# Patient Record
Sex: FEMALE | Race: BLACK OR AFRICAN AMERICAN | Employment: UNEMPLOYED | URBAN - METROPOLITAN AREA
[De-identification: names, ages, dates, MRNs, and addresses within clinical notes are randomized per-mention and may not be internally consistent; named-entity substitution may affect disease eponyms.]

---

## 2017-11-17 ENCOUNTER — HOSPITAL ENCOUNTER (OUTPATIENT)
Age: 57
Setting detail: SPECIMEN
Discharge: HOME | End: 2017-11-17
Payer: MEDICARE

## 2017-11-22 LAB — SURGICAL PATHOLOGY REPORT: NORMAL

## 2019-09-20 ENCOUNTER — HOSPITAL ENCOUNTER (OUTPATIENT)
Age: 59
Setting detail: SPECIMEN
Discharge: HOME | End: 2019-09-20
Payer: MEDICARE

## 2019-09-23 LAB — SURGICAL PATHOLOGY REPORT: NORMAL

## 2020-01-01 ENCOUNTER — HOSPITAL ENCOUNTER (OUTPATIENT)
Facility: MEDICAL CENTER | Age: 60
Discharge: HOME | End: 2020-08-11
Payer: MEDICARE

## 2020-01-01 ENCOUNTER — HOSPITAL ENCOUNTER (OUTPATIENT)
Dept: INTERVENTIONAL RADIOLOGY/VASCULAR | Age: 60
Discharge: HOME | End: 2020-11-01
Payer: MEDICARE

## 2020-01-01 ENCOUNTER — HOSPITAL ENCOUNTER (OUTPATIENT)
Dept: CT IMAGING | Age: 60
Discharge: HOME | End: 2020-04-11
Payer: MEDICARE

## 2020-01-01 ENCOUNTER — APPOINTMENT (OUTPATIENT)
Dept: GENERAL RADIOLOGY | Age: 60
DRG: 139 | End: 2020-01-01
Payer: MEDICARE

## 2020-01-01 ENCOUNTER — HOSPITAL ENCOUNTER (OUTPATIENT)
Dept: GENERAL RADIOLOGY | Age: 60
Discharge: HOME | End: 2020-04-11
Payer: MEDICARE

## 2020-01-01 ENCOUNTER — HOSPITAL ENCOUNTER (OUTPATIENT)
Dept: INTERVENTIONAL RADIOLOGY/VASCULAR | Age: 60
Discharge: HOME | End: 2020-04-11
Payer: MEDICARE

## 2020-01-01 ENCOUNTER — HOSPITAL ENCOUNTER (INPATIENT)
Age: 60
LOS: 3 days | DRG: 139 | End: 2020-12-17
Attending: EMERGENCY MEDICINE | Admitting: INTERNAL MEDICINE
Payer: MEDICARE

## 2020-01-01 ENCOUNTER — APPOINTMENT (OUTPATIENT)
Dept: GENERAL RADIOLOGY | Age: 60
DRG: 144 | End: 2020-01-01
Payer: MEDICARE

## 2020-01-01 ENCOUNTER — TELEPHONE (OUTPATIENT)
Dept: INTERVENTIONAL RADIOLOGY/VASCULAR | Age: 60
End: 2020-01-01

## 2020-01-01 ENCOUNTER — TELEPHONE (OUTPATIENT)
Dept: CASE MANAGEMENT | Age: 60
End: 2020-01-01

## 2020-01-01 ENCOUNTER — TELEPHONE (OUTPATIENT)
Dept: INFUSION THERAPY | Facility: MEDICAL CENTER | Age: 60
End: 2020-01-01

## 2020-01-01 ENCOUNTER — ANESTHESIA (OUTPATIENT)
Dept: OPERATING ROOM | Age: 60
DRG: 144 | End: 2020-01-01
Payer: MEDICARE

## 2020-01-01 ENCOUNTER — TELEPHONE (OUTPATIENT)
Dept: ONCOLOGY | Age: 60
End: 2020-01-01

## 2020-01-01 ENCOUNTER — ANESTHESIA EVENT (OUTPATIENT)
Dept: OPERATING ROOM | Age: 60
DRG: 144 | End: 2020-01-01
Payer: MEDICARE

## 2020-01-01 ENCOUNTER — TELEPHONE (OUTPATIENT)
Dept: PRIMARY CARE CLINIC | Age: 60
End: 2020-01-01

## 2020-01-01 ENCOUNTER — APPOINTMENT (OUTPATIENT)
Dept: CT IMAGING | Age: 60
DRG: 144 | End: 2020-01-01
Payer: MEDICARE

## 2020-01-01 ENCOUNTER — HOSPITAL ENCOUNTER (OUTPATIENT)
Dept: PREADMISSION TESTING | Age: 60
Setting detail: SPECIMEN
Discharge: HOME | End: 2020-10-30
Payer: MEDICARE

## 2020-01-01 ENCOUNTER — HOSPITAL ENCOUNTER (INPATIENT)
Age: 60
LOS: 3 days | Discharge: HOME | DRG: 144 | End: 2020-03-13
Attending: EMERGENCY MEDICINE | Admitting: INTERNAL MEDICINE
Payer: MEDICARE

## 2020-01-01 ENCOUNTER — HOSPITAL ENCOUNTER (OUTPATIENT)
Dept: INFUSION THERAPY | Facility: MEDICAL CENTER | Age: 60
Discharge: HOME | End: 2020-08-12
Payer: MEDICARE

## 2020-01-01 ENCOUNTER — HOSPITAL ENCOUNTER (OUTPATIENT)
Dept: GENERAL RADIOLOGY | Age: 60
Discharge: HOME | End: 2020-11-01
Payer: MEDICARE

## 2020-01-01 ENCOUNTER — APPOINTMENT (OUTPATIENT)
Dept: CT IMAGING | Age: 60
DRG: 139 | End: 2020-01-01
Payer: MEDICARE

## 2020-01-01 ENCOUNTER — APPOINTMENT (OUTPATIENT)
Dept: ULTRASOUND IMAGING | Age: 60
DRG: 144 | End: 2020-01-01
Payer: MEDICARE

## 2020-01-01 VITALS
HEIGHT: 64 IN | WEIGHT: 113.5 LBS | SYSTOLIC BLOOD PRESSURE: 131 MMHG | DIASTOLIC BLOOD PRESSURE: 77 MMHG | HEART RATE: 126 BPM | RESPIRATION RATE: 16 BRPM | BODY MASS INDEX: 19.38 KG/M2 | OXYGEN SATURATION: 90 % | TEMPERATURE: 97.5 F

## 2020-01-01 VITALS
HEART RATE: 126 BPM | RESPIRATION RATE: 18 BRPM | DIASTOLIC BLOOD PRESSURE: 82 MMHG | SYSTOLIC BLOOD PRESSURE: 114 MMHG | TEMPERATURE: 96.9 F | WEIGHT: 109 LBS | BODY MASS INDEX: 18.61 KG/M2 | HEIGHT: 64 IN | OXYGEN SATURATION: 98 %

## 2020-01-01 VITALS
WEIGHT: 117 LBS | OXYGEN SATURATION: 96 % | HEIGHT: 64 IN | DIASTOLIC BLOOD PRESSURE: 83 MMHG | RESPIRATION RATE: 23 BRPM | SYSTOLIC BLOOD PRESSURE: 117 MMHG | BODY MASS INDEX: 19.97 KG/M2 | TEMPERATURE: 96.8 F | HEART RATE: 113 BPM

## 2020-01-01 VITALS
HEIGHT: 61 IN | TEMPERATURE: 97.1 F | WEIGHT: 81 LBS | HEART RATE: 105 BPM | RESPIRATION RATE: 37 BRPM | OXYGEN SATURATION: 46 % | BODY MASS INDEX: 15.29 KG/M2 | DIASTOLIC BLOOD PRESSURE: 45 MMHG | SYSTOLIC BLOOD PRESSURE: 64 MMHG

## 2020-01-01 VITALS — DIASTOLIC BLOOD PRESSURE: 101 MMHG | SYSTOLIC BLOOD PRESSURE: 155 MMHG | OXYGEN SATURATION: 99 % | TEMPERATURE: 95.4 F

## 2020-01-01 VITALS
DIASTOLIC BLOOD PRESSURE: 82 MMHG | TEMPERATURE: 98.2 F | SYSTOLIC BLOOD PRESSURE: 113 MMHG | RESPIRATION RATE: 18 BRPM | HEART RATE: 110 BPM

## 2020-01-01 DIAGNOSIS — R91.8 MASS OF RIGHT LUNG: Primary | ICD-10-CM

## 2020-01-01 LAB
ABO/RH: NORMAL
ABSOLUTE EOS #: 0 K/UL (ref 0–0.44)
ABSOLUTE EOS #: 0.06 K/UL (ref 0–0.44)
ABSOLUTE EOS #: 0.08 K/UL (ref 0–0.4)
ABSOLUTE IMMATURE GRANULOCYTE: 0 K/UL (ref 0–0.3)
ABSOLUTE IMMATURE GRANULOCYTE: 0 K/UL (ref 0–0.3)
ABSOLUTE IMMATURE GRANULOCYTE: 0.05 K/UL (ref 0–0.3)
ABSOLUTE LYMPH #: 1.1 K/UL (ref 1.1–3.7)
ABSOLUTE LYMPH #: 1.13 K/UL (ref 1–4.8)
ABSOLUTE LYMPH #: 1.53 K/UL (ref 1.1–3.7)
ABSOLUTE MONO #: 0.62 K/UL (ref 0.2–0.8)
ABSOLUTE MONO #: 0.65 K/UL (ref 0.1–1.2)
ABSOLUTE MONO #: 0.91 K/UL (ref 0.1–1.2)
ADENOVIRUS PCR: NOT DETECTED
ANION GAP SERPL CALCULATED.3IONS-SCNC: 10 MMOL/L (ref 9–17)
ANION GAP SERPL CALCULATED.3IONS-SCNC: 11 MMOL/L (ref 9–17)
ANION GAP SERPL CALCULATED.3IONS-SCNC: 13 MMOL/L (ref 9–17)
ANION GAP SERPL CALCULATED.3IONS-SCNC: 15 MMOL/L (ref 9–17)
ANION GAP SERPL CALCULATED.3IONS-SCNC: 20 MMOL/L (ref 9–17)
ANION GAP SERPL CALCULATED.3IONS-SCNC: 8 MMOL/L (ref 9–17)
ANTIBODY SCREEN: NEGATIVE
APPEARANCE FLUID: NORMAL
ARM BAND NUMBER: NORMAL
BASO FLUID: NORMAL %
BASOPHILS # BLD: 0 % (ref 0–2)
BASOPHILS # BLD: 1 %
BASOPHILS # BLD: 1 % (ref 0–2)
BASOPHILS ABSOLUTE: 0 K/UL (ref 0–0.2)
BASOPHILS ABSOLUTE: 0.04 K/UL (ref 0–0.2)
BASOPHILS ABSOLUTE: 0.05 K/UL (ref 0–0.2)
BLD PROD TYP BPU: NORMAL
BLD PROD TYP BPU: NORMAL
BNP INTERPRETATION: NORMAL
BNP INTERPRETATION: NORMAL
BORDETELLA PARAPERTUSSIS: NOT DETECTED
BORDETELLA PERTUSSIS PCR: NOT DETECTED
BUN BLDV-MCNC: 10 MG/DL (ref 6–20)
BUN BLDV-MCNC: 14 MG/DL (ref 6–20)
BUN BLDV-MCNC: 17 MG/DL (ref 6–20)
BUN BLDV-MCNC: 6 MG/DL (ref 6–20)
BUN/CREAT BLD: 14 (ref 9–20)
BUN/CREAT BLD: 17 (ref 9–20)
BUN/CREAT BLD: 19 (ref 9–20)
BUN/CREAT BLD: 26 (ref 9–20)
BUN/CREAT BLD: 29 (ref 9–20)
BUN/CREAT BLD: 9 (ref 9–20)
C-REACTIVE PROTEIN: 15.4 MG/L (ref 0–5)
CALCIUM SERPL-MCNC: 7.9 MG/DL (ref 8.6–10.4)
CALCIUM SERPL-MCNC: 8.2 MG/DL (ref 8.6–10.4)
CALCIUM SERPL-MCNC: 8.4 MG/DL (ref 8.6–10.4)
CALCIUM SERPL-MCNC: 8.5 MG/DL (ref 8.6–10.4)
CALCIUM SERPL-MCNC: 9 MG/DL (ref 8.6–10.4)
CALCIUM SERPL-MCNC: 9.5 MG/DL (ref 8.6–10.4)
CASE NUMBER:: NORMAL
CHLAMYDIA PNEUMONIAE BY PCR: NOT DETECTED
CHLORIDE BLD-SCNC: 100 MMOL/L (ref 98–107)
CHLORIDE BLD-SCNC: 101 MMOL/L (ref 98–107)
CHLORIDE BLD-SCNC: 105 MMOL/L (ref 98–107)
CHLORIDE BLD-SCNC: 94 MMOL/L (ref 98–107)
CHLORIDE BLD-SCNC: 97 MMOL/L (ref 98–107)
CHLORIDE BLD-SCNC: 98 MMOL/L (ref 98–107)
CHOLESTEROL/HDL RATIO: 3.2
CHOLESTEROL: 152 MG/DL
CO2: 23 MMOL/L (ref 20–31)
CO2: 24 MMOL/L (ref 20–31)
CO2: 24 MMOL/L (ref 20–31)
CO2: 26 MMOL/L (ref 20–31)
CO2: 28 MMOL/L (ref 20–31)
CO2: 29 MMOL/L (ref 20–31)
COLOR FLUID: NORMAL
CORONAVIRUS 229E PCR: NOT DETECTED
CORONAVIRUS HKU1 PCR: NOT DETECTED
CORONAVIRUS NL63 PCR: NOT DETECTED
CORONAVIRUS OC43 PCR: NOT DETECTED
CREAT SERPL-MCNC: 0.49 MG/DL (ref 0.5–0.9)
CREAT SERPL-MCNC: 0.65 MG/DL (ref 0.5–0.9)
CREAT SERPL-MCNC: 0.68 MG/DL (ref 0.5–0.9)
CREAT SERPL-MCNC: 0.73 MG/DL (ref 0.5–0.9)
CREAT SERPL-MCNC: 0.74 MG/DL (ref 0.5–0.9)
CREAT SERPL-MCNC: 0.81 MG/DL (ref 0.5–0.9)
CROSSMATCH RESULT: NORMAL
CROSSMATCH RESULT: NORMAL
CULTURE: ABNORMAL
CULTURE: NO GROWTH
CULTURE: NORMAL
D-DIMER QUANTITATIVE: 3.71 MG/L FEU (ref 0–0.59)
D-DIMER QUANTITATIVE: 3.86 MG/L FEU
DIFFERENTIAL TYPE: ABNORMAL
DIRECT EXAM: ABNORMAL
DIRECT EXAM: ABNORMAL
DIRECT EXAM: NORMAL
DISPENSE STATUS BLOOD BANK: NORMAL
DISPENSE STATUS BLOOD BANK: NORMAL
EKG ATRIAL RATE: 117 BPM
EKG ATRIAL RATE: 134 BPM
EKG P AXIS: 75 DEGREES
EKG P AXIS: 76 DEGREES
EKG P-R INTERVAL: 126 MS
EKG P-R INTERVAL: 148 MS
EKG Q-T INTERVAL: 304 MS
EKG Q-T INTERVAL: 324 MS
EKG QRS DURATION: 68 MS
EKG QRS DURATION: 70 MS
EKG QTC CALCULATION (BAZETT): 451 MS
EKG QTC CALCULATION (BAZETT): 453 MS
EKG R AXIS: 29 DEGREES
EKG R AXIS: 66 DEGREES
EKG T AXIS: 55 DEGREES
EKG T AXIS: 74 DEGREES
EKG VENTRICULAR RATE: 117 BPM
EKG VENTRICULAR RATE: 134 BPM
EOSINOPHIL FLUID: NORMAL %
EOSINOPHILS RELATIVE PERCENT: 0 % (ref 1–4)
EOSINOPHILS RELATIVE PERCENT: 1 % (ref 1–4)
EOSINOPHILS RELATIVE PERCENT: 2 % (ref 1–4)
EXPIRATION DATE: NORMAL
FERRITIN: 3423 UG/L (ref 13–150)
FIO2: 1
FIO2: 1
FIO2: 100
FLOW CYTOMETRY SOURCE: NORMAL
FLOW CYTOMETRY, NODE/FLUID: NORMAL
FLUID DIFF COMMENT: NORMAL
FOLATE: >20 NG/ML
GFR AFRICAN AMERICAN: >60 ML/MIN
GFR NON-AFRICAN AMERICAN: >60 ML/MIN
GFR SERPL CREATININE-BSD FRML MDRD: ABNORMAL ML/MIN/{1.73_M2}
GLUCOSE BLD-MCNC: 108 MG/DL (ref 70–99)
GLUCOSE BLD-MCNC: 113 MG/DL (ref 70–99)
GLUCOSE BLD-MCNC: 118 MG/DL (ref 65–105)
GLUCOSE BLD-MCNC: 122 MG/DL (ref 65–105)
GLUCOSE BLD-MCNC: 135 MG/DL (ref 70–99)
GLUCOSE BLD-MCNC: 152 MG/DL (ref 65–105)
GLUCOSE BLD-MCNC: 243 MG/DL (ref 65–105)
GLUCOSE BLD-MCNC: 273 MG/DL (ref 65–105)
GLUCOSE BLD-MCNC: 273 MG/DL (ref 70–99)
GLUCOSE BLD-MCNC: 94 MG/DL (ref 70–99)
GLUCOSE BLD-MCNC: 95 MG/DL (ref 70–99)
GLUCOSE, FLUID: 125 MG/DL
HCT VFR BLD CALC: 26.2 % (ref 36.3–47.1)
HCT VFR BLD CALC: 28.6 % (ref 36.3–47.1)
HCT VFR BLD CALC: 32.9 % (ref 36.3–47.1)
HCT VFR BLD CALC: 34.6 % (ref 36.3–47.1)
HCT VFR BLD CALC: 36 % (ref 36.3–47.1)
HCT VFR BLD CALC: 38.1 % (ref 36.3–47.1)
HDLC SERPL-MCNC: 47 MG/DL
HEMOGLOBIN: 10.6 G/DL (ref 11.9–15.1)
HEMOGLOBIN: 11.7 G/DL (ref 11.9–15.1)
HEMOGLOBIN: 11.9 G/DL (ref 11.9–15.1)
HEMOGLOBIN: 12.7 G/DL (ref 11.9–15.1)
HEMOGLOBIN: 8.3 G/DL (ref 11.9–15.1)
HEMOGLOBIN: 9.2 G/DL (ref 11.9–15.1)
HUMAN METAPNEUMOVIRUS PCR: NOT DETECTED
IMMATURE GRANULOCYTES: 0 %
IMMATURE GRANULOCYTES: 0 %
IMMATURE GRANULOCYTES: 1 %
INFLUENZA A BY PCR: NOT DETECTED
INFLUENZA A H1 (2009) PCR: NORMAL
INFLUENZA A H1 PCR: NORMAL
INFLUENZA A H3 PCR: NORMAL
INFLUENZA B BY PCR: NOT DETECTED
INR BLD: 1
INR BLD: 1.1
IRON SATURATION: 60 % (ref 20–55)
IRON: 96 UG/DL (ref 37–145)
LACTATE DEHYDROGENASE, FLUID: 381 U/L
LACTATE DEHYDROGENASE: 592 U/L (ref 135–214)
LDL CHOLESTEROL: 85 MG/DL (ref 0–130)
LV EF: 65 %
LVEF MODALITY: NORMAL
LYMPHOCYTES # BLD: 23 % (ref 24–43)
LYMPHOCYTES # BLD: 26 % (ref 24–43)
LYMPHOCYTES # BLD: 29 % (ref 24–44)
LYMPHOCYTES, BODY FLUID: 13 %
Lab: ABNORMAL
Lab: NORMAL
MAGNESIUM: 1.2 MG/DL (ref 1.6–2.6)
MAGNESIUM: 1.9 MG/DL (ref 1.6–2.6)
MCH RBC QN AUTO: 34.7 PG (ref 25.2–33.5)
MCH RBC QN AUTO: 35 PG (ref 25.2–33.5)
MCH RBC QN AUTO: 35.5 PG (ref 25.2–33.5)
MCH RBC QN AUTO: 37.1 PG (ref 25.2–33.5)
MCH RBC QN AUTO: 37.6 PG (ref 25.2–33.5)
MCH RBC QN AUTO: 38 PG (ref 25.2–33.5)
MCHC RBC AUTO-ENTMCNC: 31.7 G/DL (ref 28.4–34.8)
MCHC RBC AUTO-ENTMCNC: 32.2 G/DL (ref 28.4–34.8)
MCHC RBC AUTO-ENTMCNC: 32.2 G/DL (ref 28.4–34.8)
MCHC RBC AUTO-ENTMCNC: 33.1 G/DL (ref 28.4–34.8)
MCHC RBC AUTO-ENTMCNC: 33.3 G/DL (ref 28.4–34.8)
MCHC RBC AUTO-ENTMCNC: 33.8 G/DL (ref 28.4–34.8)
MCV RBC AUTO: 108.6 FL (ref 82.6–102.9)
MCV RBC AUTO: 109.6 FL (ref 82.6–102.9)
MCV RBC AUTO: 110.4 FL (ref 82.6–102.9)
MCV RBC AUTO: 111.3 FL (ref 82.6–102.9)
MCV RBC AUTO: 112.1 FL (ref 82.6–102.9)
MCV RBC AUTO: 114.1 FL (ref 82.6–102.9)
MISCELLANEOUS LAB TEST RESULT: NORMAL
MONOCYTE, FLUID: NORMAL %
MONOCYTES # BLD: 11 % (ref 3–12)
MONOCYTES # BLD: 16 % (ref 1–7)
MONOCYTES # BLD: 19 % (ref 3–12)
MORPHOLOGY: ABNORMAL
MYCOPLASMA PNEUMONIAE PCR: NOT DETECTED
MYOGLOBIN: 22 NG/ML (ref 25–58)
MYOGLOBIN: 22 NG/ML (ref 25–58)
NEGATIVE BASE EXCESS, ART: 20 (ref 0–2)
NEGATIVE BASE EXCESS, ART: ABNORMAL (ref 0–2)
NEGATIVE BASE EXCESS, ART: ABNORMAL (ref 0–2)
NEUTROPHIL, FLUID: 0 %
NRBC AUTOMATED: 0 PER 100 WBC
NRBC AUTOMATED: 0 PER 100 WBC
NRBC AUTOMATED: 0.2 PER 100 WBC
NRBC AUTOMATED: 0.4 PER 100 WBC
NRBC AUTOMATED: 0.4 PER 100 WBC
NRBC AUTOMATED: ABNORMAL PER 100 WBC
O2 DEVICE/FLOW/%: ABNORMAL
OTHER CELLS FLUID: NORMAL %
PARAINFLUENZA 1 PCR: NOT DETECTED
PARAINFLUENZA 2 PCR: NOT DETECTED
PARAINFLUENZA 3 PCR: NOT DETECTED
PARAINFLUENZA 4 PCR: NOT DETECTED
PARTIAL THROMBOPLASTIN TIME: 30.8 SEC (ref 23–31)
PARTIAL THROMBOPLASTIN TIME: 31 SEC (ref 23–31)
PARTIAL THROMBOPLASTIN TIME: 31.4 SEC (ref 23–31)
PATIENT TEMP: 37
PATIENT TEMP: 37
PATIENT TEMP: 98.9
PDW BLD-RTO: 12.8 % (ref 11.8–14.4)
PDW BLD-RTO: 12.9 % (ref 11.8–14.4)
PDW BLD-RTO: 13 % (ref 11.8–14.4)
PDW BLD-RTO: 22.3 % (ref 11.8–14.4)
PDW BLD-RTO: 22.5 % (ref 11.8–14.4)
PDW BLD-RTO: 22.5 % (ref 11.8–14.4)
PH FLUID: 8
PLATELET # BLD: 103 K/UL (ref 138–453)
PLATELET # BLD: 112 K/UL (ref 138–453)
PLATELET # BLD: 115 K/UL (ref 138–453)
PLATELET # BLD: 125 K/UL (ref 138–453)
PLATELET # BLD: 140 K/UL (ref 138–453)
PLATELET # BLD: 148 K/UL (ref 138–453)
PLATELET # BLD: 161 K/UL (ref 138–453)
PLATELET # BLD: 163 K/UL (ref 138–453)
PLATELET ESTIMATE: ABNORMAL
PMV BLD AUTO: 10.1 FL (ref 8.1–13.5)
PMV BLD AUTO: 10.1 FL (ref 8.1–13.5)
PMV BLD AUTO: 10.3 FL (ref 8.1–13.5)
PMV BLD AUTO: 11.1 FL (ref 8.1–13.5)
PMV BLD AUTO: 11.6 FL (ref 8.1–13.5)
PMV BLD AUTO: 11.9 FL (ref 8.1–13.5)
POC HCO3: 28.4 MMOL/L (ref 22–27)
POC HCO3: 32.1 MMOL/L (ref 22–27)
POC HCO3: 9.9 MMOL/L (ref 22–27)
POC O2 SATURATION: 84 %
POC O2 SATURATION: 88 %
POC O2 SATURATION: 92 %
POC PCO2 TEMP: ABNORMAL MM HG
POC PCO2: 32 MM HG (ref 32–45)
POC PCO2: 44 MM HG (ref 32–45)
POC PCO2: 45 MM HG (ref 32–45)
POC PH TEMP: ABNORMAL
POC PH: 7.1 (ref 7.35–7.45)
POC PH: 7.42 (ref 7.35–7.45)
POC PH: 7.46 (ref 7.35–7.45)
POC PO2 TEMP: ABNORMAL MM HG
POC PO2: 53 MM HG (ref 75–95)
POC PO2: 62 MM HG (ref 75–95)
POC PO2: 65 MM HG (ref 75–95)
POSITIVE BASE EXCESS, ART: 4 (ref 0–2)
POSITIVE BASE EXCESS, ART: 8 (ref 0–2)
POSITIVE BASE EXCESS, ART: ABNORMAL (ref 0–2)
POTASSIUM SERPL-SCNC: 3.4 MMOL/L (ref 3.7–5.3)
POTASSIUM SERPL-SCNC: 3.9 MMOL/L (ref 3.7–5.3)
POTASSIUM SERPL-SCNC: 4.1 MMOL/L (ref 3.7–5.3)
POTASSIUM SERPL-SCNC: 4.1 MMOL/L (ref 3.7–5.3)
POTASSIUM SERPL-SCNC: 4.3 MMOL/L (ref 3.7–5.3)
POTASSIUM SERPL-SCNC: 4.4 MMOL/L (ref 3.7–5.3)
PRO-BNP: 69 PG/ML
PRO-BNP: 98 PG/ML
PROCALCITONIN: 0.13 NG/ML
PROTHROMBIN TIME: 10.7 SEC (ref 9.7–11.6)
PROTHROMBIN TIME: 11 SEC (ref 9.7–11.6)
PROTHROMBIN TIME: 11 SEC (ref 9.7–11.6)
PROTHROMBIN TIME: 13.7 SEC (ref 11.5–14.2)
RBC # BLD: 2.39 M/UL (ref 3.95–5.11)
RBC # BLD: 2.59 M/UL (ref 3.95–5.11)
RBC # BLD: 3.03 M/UL (ref 3.95–5.11)
RBC # BLD: 3.11 M/UL (ref 3.95–5.11)
RBC # BLD: 3.21 M/UL (ref 3.95–5.11)
RBC # BLD: 3.34 M/UL (ref 3.95–5.11)
RBC # BLD: ABNORMAL 10*6/UL
RBC FLUID: NORMAL /MM3
RESP SYNCYTIAL VIRUS PCR: NOT DETECTED
RHINO/ENTEROVIRUS PCR: NOT DETECTED
SARS-COV-2, NAA: NOT DETECTED
SARS-COV-2, PCR: NOT DETECTED
SARS-COV-2, RAPID: NOT DETECTED
SARS-COV-2: NORMAL
SARS-COV-2: NORMAL
SEG NEUTROPHILS: 52 % (ref 36–66)
SEG NEUTROPHILS: 56 % (ref 36–65)
SEG NEUTROPHILS: 62 % (ref 36–65)
SEGMENTED NEUTROPHILS ABSOLUTE COUNT: 2.03 K/UL (ref 1.8–7.7)
SEGMENTED NEUTROPHILS ABSOLUTE COUNT: 2.69 K/UL (ref 1.5–8.1)
SEGMENTED NEUTROPHILS ABSOLUTE COUNT: 3.66 K/UL (ref 1.5–8.1)
SODIUM BLD-SCNC: 133 MMOL/L (ref 135–144)
SODIUM BLD-SCNC: 136 MMOL/L (ref 135–144)
SODIUM BLD-SCNC: 136 MMOL/L (ref 135–144)
SODIUM BLD-SCNC: 138 MMOL/L (ref 135–144)
SODIUM BLD-SCNC: 141 MMOL/L (ref 135–144)
SODIUM BLD-SCNC: 142 MMOL/L (ref 135–144)
SOURCE: NORMAL
SPECIMEN DESCRIPTION: ABNORMAL
SPECIMEN DESCRIPTION: NORMAL
SPECIMEN TYPE: NORMAL
SURGICAL PATHOLOGY REPORT: NORMAL
TCO2 (CALC), ART: 11 MMOL/L (ref 23–28)
TCO2 (CALC), ART: 30 MMOL/L (ref 23–28)
TCO2 (CALC), ART: 33 MMOL/L (ref 23–28)
TEST NAME: NORMAL
TOTAL IRON BINDING CAPACITY: 160 UG/DL (ref 250–450)
TOTAL PROTEIN, BODY FLUID: 5.3 G/DL
TRANSFUSION STATUS: NORMAL
TRANSFUSION STATUS: NORMAL
TRIGL SERPL-MCNC: 98 MG/DL
TROPONIN INTERP: ABNORMAL
TROPONIN INTERP: ABNORMAL
TROPONIN T: ABNORMAL NG/ML
TROPONIN T: ABNORMAL NG/ML
TROPONIN, HIGH SENSITIVITY: 10 NG/L (ref 0–14)
TROPONIN, HIGH SENSITIVITY: 7 NG/L (ref 0–14)
UNIT DIVISION: 0
UNIT DIVISION: 0
UNIT NUMBER: NORMAL
UNIT NUMBER: NORMAL
UNSATURATED IRON BINDING CAPACITY: 64 UG/DL (ref 112–347)
VITAMIN B-12: 737 PG/ML (ref 232–1245)
VLDLC SERPL CALC-MCNC: NORMAL MG/DL (ref 1–30)
WBC # BLD: 3.9 K/UL (ref 3.5–11.3)
WBC # BLD: 4.7 K/UL (ref 3.5–11.3)
WBC # BLD: 4.8 K/UL (ref 3.5–11.3)
WBC # BLD: 4.9 K/UL (ref 3.5–11.3)
WBC # BLD: 5.9 K/UL (ref 3.5–11.3)
WBC # BLD: 9.7 K/UL (ref 3.5–11.3)
WBC # BLD: ABNORMAL 10*3/UL
WBC FLUID: 1154 /MM3

## 2020-01-01 PROCEDURE — 6370000000 HC RX 637 (ALT 250 FOR IP): Performed by: NURSE PRACTITIONER

## 2020-01-01 PROCEDURE — APPSS45 APP SPLIT SHARED TIME 31-45 MINUTES: Performed by: NURSE PRACTITIONER

## 2020-01-01 PROCEDURE — 2580000003 HC RX 258: Performed by: INTERNAL MEDICINE

## 2020-01-01 PROCEDURE — 94761 N-INVAS EAR/PLS OXIMETRY MLT: CPT

## 2020-01-01 PROCEDURE — 80048 BASIC METABOLIC PNL TOTAL CA: CPT

## 2020-01-01 PROCEDURE — 2500000003 HC RX 250 WO HCPCS: Performed by: INTERNAL MEDICINE

## 2020-01-01 PROCEDURE — 86901 BLOOD TYPING SEROLOGIC RH(D): CPT

## 2020-01-01 PROCEDURE — 85730 THROMBOPLASTIN TIME PARTIAL: CPT

## 2020-01-01 PROCEDURE — 87102 FUNGUS ISOLATION CULTURE: CPT

## 2020-01-01 PROCEDURE — C1729 CATH, DRAINAGE: HCPCS

## 2020-01-01 PROCEDURE — 1200000000 HC SEMI PRIVATE

## 2020-01-01 PROCEDURE — 83735 ASSAY OF MAGNESIUM: CPT

## 2020-01-01 PROCEDURE — U0002 COVID-19 LAB TEST NON-CDC: HCPCS

## 2020-01-01 PROCEDURE — 71260 CT THORAX DX C+: CPT

## 2020-01-01 PROCEDURE — 88305 TISSUE EXAM BY PATHOLOGIST: CPT

## 2020-01-01 PROCEDURE — 6360000002 HC RX W HCPCS

## 2020-01-01 PROCEDURE — 82746 ASSAY OF FOLIC ACID SERUM: CPT

## 2020-01-01 PROCEDURE — 84157 ASSAY OF PROTEIN OTHER: CPT

## 2020-01-01 PROCEDURE — 6360000002 HC RX W HCPCS: Performed by: EMERGENCY MEDICINE

## 2020-01-01 PROCEDURE — 99284 EMERGENCY DEPT VISIT MOD MDM: CPT

## 2020-01-01 PROCEDURE — 96375 TX/PRO/DX INJ NEW DRUG ADDON: CPT

## 2020-01-01 PROCEDURE — 97535 SELF CARE MNGMENT TRAINING: CPT

## 2020-01-01 PROCEDURE — 85049 AUTOMATED PLATELET COUNT: CPT

## 2020-01-01 PROCEDURE — 6370000000 HC RX 637 (ALT 250 FOR IP): Performed by: EMERGENCY MEDICINE

## 2020-01-01 PROCEDURE — 6360000002 HC RX W HCPCS: Performed by: NURSE PRACTITIONER

## 2020-01-01 PROCEDURE — 82947 ASSAY GLUCOSE BLOOD QUANT: CPT

## 2020-01-01 PROCEDURE — 7100000001 HC PACU RECOVERY - ADDTL 15 MIN: Performed by: INTERNAL MEDICINE

## 2020-01-01 PROCEDURE — 7100000000 HC PACU RECOVERY - FIRST 15 MIN: Performed by: INTERNAL MEDICINE

## 2020-01-01 PROCEDURE — 88112 CYTOPATH CELL ENHANCE TECH: CPT

## 2020-01-01 PROCEDURE — 2700000000 HC OXYGEN THERAPY PER DAY

## 2020-01-01 PROCEDURE — 2580000003 HC RX 258: Performed by: NURSE PRACTITIONER

## 2020-01-01 PROCEDURE — 82607 VITAMIN B-12: CPT

## 2020-01-01 PROCEDURE — 71045 X-RAY EXAM CHEST 1 VIEW: CPT

## 2020-01-01 PROCEDURE — 6360000002 HC RX W HCPCS: Performed by: RADIOLOGY

## 2020-01-01 PROCEDURE — 85027 COMPLETE CBC AUTOMATED: CPT

## 2020-01-01 PROCEDURE — 87070 CULTURE OTHR SPECIMN AEROBIC: CPT

## 2020-01-01 PROCEDURE — 86920 COMPATIBILITY TEST SPIN: CPT

## 2020-01-01 PROCEDURE — 6370000000 HC RX 637 (ALT 250 FOR IP)

## 2020-01-01 PROCEDURE — 85025 COMPLETE CBC W/AUTO DIFF WBC: CPT

## 2020-01-01 PROCEDURE — 2500000003 HC RX 250 WO HCPCS: Performed by: NURSE PRACTITIONER

## 2020-01-01 PROCEDURE — 87015 SPECIMEN INFECT AGNT CONCNTJ: CPT

## 2020-01-01 PROCEDURE — 99238 HOSP IP/OBS DSCHRG MGMT 30/<: CPT | Performed by: INTERNAL MEDICINE

## 2020-01-01 PROCEDURE — 0BD48ZX EXTRACTION OF RIGHT UPPER LOBE BRONCHUS, VIA NATURAL OR ARTIFICIAL OPENING ENDOSCOPIC, DIAGNOSTIC: ICD-10-PCS | Performed by: INTERNAL MEDICINE

## 2020-01-01 PROCEDURE — 87255 GENET VIRUS ISOLATE HSV: CPT

## 2020-01-01 PROCEDURE — 94640 AIRWAY INHALATION TREATMENT: CPT

## 2020-01-01 PROCEDURE — 3700000000 HC ANESTHESIA ATTENDED CARE: Performed by: INTERNAL MEDICINE

## 2020-01-01 PROCEDURE — 93005 ELECTROCARDIOGRAM TRACING: CPT | Performed by: NURSE PRACTITIONER

## 2020-01-01 PROCEDURE — 87075 CULTR BACTERIA EXCEPT BLOOD: CPT

## 2020-01-01 PROCEDURE — 85610 PROTHROMBIN TIME: CPT

## 2020-01-01 PROCEDURE — 36600 WITHDRAWAL OF ARTERIAL BLOOD: CPT

## 2020-01-01 PROCEDURE — 87116 MYCOBACTERIA CULTURE: CPT

## 2020-01-01 PROCEDURE — 99223 1ST HOSP IP/OBS HIGH 75: CPT | Performed by: INTERNAL MEDICINE

## 2020-01-01 PROCEDURE — 3700000001 HC ADD 15 MINUTES (ANESTHESIA): Performed by: INTERNAL MEDICINE

## 2020-01-01 PROCEDURE — 0BD38ZX EXTRACTION OF RIGHT MAIN BRONCHUS, VIA NATURAL OR ARTIFICIAL OPENING ENDOSCOPIC, DIAGNOSTIC: ICD-10-PCS | Performed by: INTERNAL MEDICINE

## 2020-01-01 PROCEDURE — 77012 CT SCAN FOR NEEDLE BIOPSY: CPT

## 2020-01-01 PROCEDURE — 83615 LACTATE (LD) (LDH) ENZYME: CPT

## 2020-01-01 PROCEDURE — 0202U NFCT DS 22 TRGT SARS-COV-2: CPT

## 2020-01-01 PROCEDURE — 6360000002 HC RX W HCPCS: Performed by: INTERNAL MEDICINE

## 2020-01-01 PROCEDURE — 88342 IMHCHEM/IMCYTCHM 1ST ANTB: CPT

## 2020-01-01 PROCEDURE — 88184 FLOWCYTOMETRY/ TC 1 MARKER: CPT

## 2020-01-01 PROCEDURE — 36415 COLL VENOUS BLD VENIPUNCTURE: CPT

## 2020-01-01 PROCEDURE — 6360000004 HC RX CONTRAST MEDICATION: Performed by: EMERGENCY MEDICINE

## 2020-01-01 PROCEDURE — 99233 SBSQ HOSP IP/OBS HIGH 50: CPT | Performed by: NURSE PRACTITIONER

## 2020-01-01 PROCEDURE — 94760 N-INVAS EAR/PLS OXIMETRY 1: CPT

## 2020-01-01 PROCEDURE — U0003 INFECTIOUS AGENT DETECTION BY NUCLEIC ACID (DNA OR RNA); SEVERE ACUTE RESPIRATORY SYNDROME CORONAVIRUS 2 (SARS-COV-2) (CORONAVIRUS DISEASE [COVID-19]), AMPLIFIED PROBE TECHNIQUE, MAKING USE OF HIGH THROUGHPUT TECHNOLOGIES AS DESCRIBED BY CMS-2020-01-R: HCPCS

## 2020-01-01 PROCEDURE — 87252 VIRUS INOCULATION TISSUE: CPT

## 2020-01-01 PROCEDURE — 32555 ASPIRATE PLEURA W/ IMAGING: CPT

## 2020-01-01 PROCEDURE — 99232 SBSQ HOSP IP/OBS MODERATE 35: CPT | Performed by: INTERNAL MEDICINE

## 2020-01-01 PROCEDURE — 93306 TTE W/DOPPLER COMPLETE: CPT

## 2020-01-01 PROCEDURE — 94660 CPAP INITIATION&MGMT: CPT

## 2020-01-01 PROCEDURE — 32555 ASPIRATE PLEURA W/ IMAGING: CPT | Performed by: RADIOLOGY

## 2020-01-01 PROCEDURE — 86900 BLOOD TYPING SEROLOGIC ABO: CPT

## 2020-01-01 PROCEDURE — 88185 FLOWCYTOMETRY/TC ADD-ON: CPT

## 2020-01-01 PROCEDURE — P9016 RBC LEUKOCYTES REDUCED: HCPCS

## 2020-01-01 PROCEDURE — 7100000011 HC PHASE II RECOVERY - ADDTL 15 MIN

## 2020-01-01 PROCEDURE — 85379 FIBRIN DEGRADATION QUANT: CPT

## 2020-01-01 PROCEDURE — 80061 LIPID PANEL: CPT

## 2020-01-01 PROCEDURE — 99222 1ST HOSP IP/OBS MODERATE 55: CPT | Performed by: NURSE PRACTITIONER

## 2020-01-01 PROCEDURE — 87140 CULTURE TYPE IMMUNOFLUORESC: CPT

## 2020-01-01 PROCEDURE — 87206 SMEAR FLUORESCENT/ACID STAI: CPT

## 2020-01-01 PROCEDURE — 84145 PROCALCITONIN (PCT): CPT

## 2020-01-01 PROCEDURE — 99233 SBSQ HOSP IP/OBS HIGH 50: CPT | Performed by: INTERNAL MEDICINE

## 2020-01-01 PROCEDURE — 2000000000 HC ICU R&B

## 2020-01-01 PROCEDURE — 2709999900 CT NEEDLE BIOPSY LUNG PERCUTANEOUS

## 2020-01-01 PROCEDURE — 83880 ASSAY OF NATRIURETIC PEPTIDE: CPT

## 2020-01-01 PROCEDURE — 36430 TRANSFUSION BLD/BLD COMPNT: CPT

## 2020-01-01 PROCEDURE — 93005 ELECTROCARDIOGRAM TRACING: CPT | Performed by: EMERGENCY MEDICINE

## 2020-01-01 PROCEDURE — 82728 ASSAY OF FERRITIN: CPT

## 2020-01-01 PROCEDURE — 2060000000 HC ICU INTERMEDIATE R&B

## 2020-01-01 PROCEDURE — 2580000003 HC RX 258: Performed by: RADIOLOGY

## 2020-01-01 PROCEDURE — 87205 SMEAR GRAM STAIN: CPT

## 2020-01-01 PROCEDURE — 6360000004 HC RX CONTRAST MEDICATION: Performed by: RADIOLOGY

## 2020-01-01 PROCEDURE — 6360000004 HC RX CONTRAST MEDICATION: Performed by: INTERNAL MEDICINE

## 2020-01-01 PROCEDURE — 6370000000 HC RX 637 (ALT 250 FOR IP): Performed by: INTERNAL MEDICINE

## 2020-01-01 PROCEDURE — 2580000003 HC RX 258: Performed by: EMERGENCY MEDICINE

## 2020-01-01 PROCEDURE — 83986 ASSAY PH BODY FLUID NOS: CPT

## 2020-01-01 PROCEDURE — 2580000003 HC RX 258

## 2020-01-01 PROCEDURE — 76604 US EXAM CHEST: CPT | Performed by: RADIOLOGY

## 2020-01-01 PROCEDURE — 97530 THERAPEUTIC ACTIVITIES: CPT

## 2020-01-01 PROCEDURE — 7100000010 HC PHASE II RECOVERY - FIRST 15 MIN

## 2020-01-01 PROCEDURE — 02HV33Z INSERTION OF INFUSION DEVICE INTO SUPERIOR VENA CAVA, PERCUTANEOUS APPROACH: ICD-10-PCS | Performed by: INTERNAL MEDICINE

## 2020-01-01 PROCEDURE — 99285 EMERGENCY DEPT VISIT HI MDM: CPT

## 2020-01-01 PROCEDURE — 2709999900 HC NON-CHARGEABLE SUPPLY: Performed by: INTERNAL MEDICINE

## 2020-01-01 PROCEDURE — 97166 OT EVAL MOD COMPLEX 45 MIN: CPT

## 2020-01-01 PROCEDURE — 82945 GLUCOSE OTHER FLUID: CPT

## 2020-01-01 PROCEDURE — 86850 RBC ANTIBODY SCREEN: CPT

## 2020-01-01 PROCEDURE — 96374 THER/PROPH/DIAG INJ IV PUSH: CPT

## 2020-01-01 PROCEDURE — 0W993ZZ DRAINAGE OF RIGHT PLEURAL CAVITY, PERCUTANEOUS APPROACH: ICD-10-PCS | Performed by: RADIOLOGY

## 2020-01-01 PROCEDURE — 84484 ASSAY OF TROPONIN QUANT: CPT

## 2020-01-01 PROCEDURE — 3609011100 HC BRONCHOSCOPY BRUSHINGS: Performed by: INTERNAL MEDICINE

## 2020-01-01 PROCEDURE — 97163 PT EVAL HIGH COMPLEX 45 MIN: CPT

## 2020-01-01 PROCEDURE — 86140 C-REACTIVE PROTEIN: CPT

## 2020-01-01 PROCEDURE — 83540 ASSAY OF IRON: CPT

## 2020-01-01 PROCEDURE — 96360 HYDRATION IV INFUSION INIT: CPT

## 2020-01-01 PROCEDURE — 88333 PATH CONSLTJ SURG CYTO XM 1: CPT

## 2020-01-01 PROCEDURE — 2500000003 HC RX 250 WO HCPCS

## 2020-01-01 PROCEDURE — 82803 BLOOD GASES ANY COMBINATION: CPT

## 2020-01-01 PROCEDURE — 83874 ASSAY OF MYOGLOBIN: CPT

## 2020-01-01 PROCEDURE — 88341 IMHCHEM/IMCYTCHM EA ADD ANTB: CPT

## 2020-01-01 PROCEDURE — 83550 IRON BINDING TEST: CPT

## 2020-01-01 PROCEDURE — 89051 BODY FLUID CELL COUNT: CPT

## 2020-01-01 PROCEDURE — 87040 BLOOD CULTURE FOR BACTERIA: CPT

## 2020-01-01 PROCEDURE — 87300 AG DETECTION POLYVAL IF: CPT

## 2020-01-01 PROCEDURE — 6360000002 HC RX W HCPCS: Performed by: NURSE ANESTHETIST, CERTIFIED REGISTERED

## 2020-01-01 RX ORDER — 0.9 % SODIUM CHLORIDE 0.9 %
1000 INTRAVENOUS SOLUTION INTRAVENOUS ONCE
Status: COMPLETED | OUTPATIENT
Start: 2020-01-01 | End: 2020-01-01

## 2020-01-01 RX ORDER — SODIUM CHLORIDE, SODIUM LACTATE, POTASSIUM CHLORIDE, CALCIUM CHLORIDE 600; 310; 30; 20 MG/100ML; MG/100ML; MG/100ML; MG/100ML
INJECTION, SOLUTION INTRAVENOUS CONTINUOUS PRN
Status: DISCONTINUED | OUTPATIENT
Start: 2020-01-01 | End: 2020-01-01 | Stop reason: SDUPTHER

## 2020-01-01 RX ORDER — SODIUM CHLORIDE 0.9 % (FLUSH) 0.9 %
10 SYRINGE (ML) INJECTION PRN
Status: CANCELLED | OUTPATIENT
Start: 2020-01-01

## 2020-01-01 RX ORDER — DEXTROSE MONOHYDRATE 25 G/50ML
12.5 INJECTION, SOLUTION INTRAVENOUS PRN
Status: DISCONTINUED | OUTPATIENT
Start: 2020-01-01 | End: 2020-12-17 | Stop reason: HOSPADM

## 2020-01-01 RX ORDER — ALPRAZOLAM 0.25 MG/1
0.25 TABLET ORAL 3 TIMES DAILY PRN
Status: DISCONTINUED | OUTPATIENT
Start: 2020-01-01 | End: 2020-12-17 | Stop reason: HOSPADM

## 2020-01-01 RX ORDER — NICOTINE 21 MG/24HR
1 PATCH, TRANSDERMAL 24 HOURS TRANSDERMAL DAILY PRN
Status: DISCONTINUED | OUTPATIENT
Start: 2020-01-01 | End: 2020-12-17 | Stop reason: HOSPADM

## 2020-01-01 RX ORDER — MORPHINE SULFATE 2 MG/ML
2 INJECTION, SOLUTION INTRAMUSCULAR; INTRAVENOUS EVERY 30 MIN PRN
Status: DISCONTINUED | OUTPATIENT
Start: 2020-01-01 | End: 2020-12-17 | Stop reason: HOSPADM

## 2020-01-01 RX ORDER — DEXMEDETOMIDINE HYDROCHLORIDE 4 UG/ML
0.2 INJECTION, SOLUTION INTRAVENOUS CONTINUOUS
Status: DISCONTINUED | OUTPATIENT
Start: 2020-01-01 | End: 2020-01-01

## 2020-01-01 RX ORDER — BUDESONIDE AND FORMOTEROL FUMARATE DIHYDRATE 160; 4.5 UG/1; UG/1
2 AEROSOL RESPIRATORY (INHALATION) 2 TIMES DAILY
Status: DISCONTINUED | OUTPATIENT
Start: 2020-01-01 | End: 2020-12-17 | Stop reason: HOSPADM

## 2020-01-01 RX ORDER — POTASSIUM CHLORIDE 20 MEQ/1
40 TABLET, EXTENDED RELEASE ORAL PRN
Status: DISCONTINUED | OUTPATIENT
Start: 2020-01-01 | End: 2020-12-17 | Stop reason: HOSPADM

## 2020-01-01 RX ORDER — BUDESONIDE AND FORMOTEROL FUMARATE DIHYDRATE 160; 4.5 UG/1; UG/1
2 AEROSOL RESPIRATORY (INHALATION) 2 TIMES DAILY
Status: DISCONTINUED | OUTPATIENT
Start: 2020-01-01 | End: 2020-01-01 | Stop reason: HOSPADM

## 2020-01-01 RX ORDER — 0.9 % SODIUM CHLORIDE 0.9 %
500 INTRAVENOUS SOLUTION INTRAVENOUS ONCE
Status: COMPLETED | OUTPATIENT
Start: 2020-01-01 | End: 2020-01-01

## 2020-01-01 RX ORDER — POLYETHYLENE GLYCOL 3350 17 G/17G
17 POWDER, FOR SOLUTION ORAL DAILY PRN
Status: DISCONTINUED | OUTPATIENT
Start: 2020-01-01 | End: 2020-01-01 | Stop reason: HOSPADM

## 2020-01-01 RX ORDER — LIDOCAINE HYDROCHLORIDE 20 MG/ML
INJECTION, SOLUTION EPIDURAL; INFILTRATION; INTRACAUDAL; PERINEURAL PRN
Status: DISCONTINUED | OUTPATIENT
Start: 2020-01-01 | End: 2020-01-01 | Stop reason: SDUPTHER

## 2020-01-01 RX ORDER — NICOTINE POLACRILEX 4 MG
15 LOZENGE BUCCAL PRN
Status: DISCONTINUED | OUTPATIENT
Start: 2020-01-01 | End: 2020-12-17 | Stop reason: HOSPADM

## 2020-01-01 RX ORDER — LEVALBUTEROL 1.25 MG/.5ML
1.25 SOLUTION, CONCENTRATE RESPIRATORY (INHALATION) EVERY 6 HOURS
Status: DISCONTINUED | OUTPATIENT
Start: 2020-01-01 | End: 2020-01-01 | Stop reason: HOSPADM

## 2020-01-01 RX ORDER — HYDROMORPHONE HCL 110MG/55ML
0.25 PATIENT CONTROLLED ANALGESIA SYRINGE INTRAVENOUS EVERY 5 MIN PRN
Status: DISCONTINUED | OUTPATIENT
Start: 2020-01-01 | End: 2020-01-01 | Stop reason: HOSPADM

## 2020-01-01 RX ORDER — MIDAZOLAM HYDROCHLORIDE 1 MG/ML
INJECTION INTRAMUSCULAR; INTRAVENOUS
Status: COMPLETED | OUTPATIENT
Start: 2020-01-01 | End: 2020-01-01

## 2020-01-01 RX ORDER — METOPROLOL TARTRATE 5 MG/5ML
5 INJECTION INTRAVENOUS EVERY 4 HOURS PRN
Status: DISCONTINUED | OUTPATIENT
Start: 2020-01-01 | End: 2020-12-17 | Stop reason: HOSPADM

## 2020-01-01 RX ORDER — POTASSIUM CHLORIDE 7.45 MG/ML
10 INJECTION INTRAVENOUS PRN
Status: DISCONTINUED | OUTPATIENT
Start: 2020-01-01 | End: 2020-01-01 | Stop reason: HOSPADM

## 2020-01-01 RX ORDER — DIPHENHYDRAMINE HCL 25 MG
25 TABLET ORAL ONCE
Status: COMPLETED | OUTPATIENT
Start: 2020-01-01 | End: 2020-01-01

## 2020-01-01 RX ORDER — LORAZEPAM 2 MG/ML
1 INJECTION INTRAMUSCULAR
Status: DISCONTINUED | OUTPATIENT
Start: 2020-01-01 | End: 2020-12-17 | Stop reason: HOSPADM

## 2020-01-01 RX ORDER — DEXTROSE MONOHYDRATE 50 MG/ML
100 INJECTION, SOLUTION INTRAVENOUS PRN
Status: DISCONTINUED | OUTPATIENT
Start: 2020-01-01 | End: 2020-12-17 | Stop reason: HOSPADM

## 2020-01-01 RX ORDER — HEPARIN SODIUM 5000 [USP'U]/ML
5000 INJECTION, SOLUTION INTRAVENOUS; SUBCUTANEOUS 2 TIMES DAILY
Status: DISCONTINUED | OUTPATIENT
Start: 2020-01-01 | End: 2020-12-17 | Stop reason: HOSPADM

## 2020-01-01 RX ORDER — MAGNESIUM SULFATE 1 G/100ML
1 INJECTION INTRAVENOUS PRN
Status: DISCONTINUED | OUTPATIENT
Start: 2020-01-01 | End: 2020-12-17 | Stop reason: HOSPADM

## 2020-01-01 RX ORDER — NICOTINE 21 MG/24HR
1 PATCH, TRANSDERMAL 24 HOURS TRANSDERMAL DAILY PRN
Status: DISCONTINUED | OUTPATIENT
Start: 2020-01-01 | End: 2020-01-01 | Stop reason: HOSPADM

## 2020-01-01 RX ORDER — METHYLPREDNISOLONE SODIUM SUCCINATE 40 MG/ML
40 INJECTION, POWDER, LYOPHILIZED, FOR SOLUTION INTRAMUSCULAR; INTRAVENOUS EVERY 6 HOURS
Status: DISCONTINUED | OUTPATIENT
Start: 2020-01-01 | End: 2020-12-17 | Stop reason: HOSPADM

## 2020-01-01 RX ORDER — 0.9 % SODIUM CHLORIDE 0.9 %
80 INTRAVENOUS SOLUTION INTRAVENOUS ONCE
Status: COMPLETED | OUTPATIENT
Start: 2020-01-01 | End: 2020-01-01

## 2020-01-01 RX ORDER — ACETAMINOPHEN 650 MG/1
650 SUPPOSITORY RECTAL EVERY 6 HOURS PRN
Status: DISCONTINUED | OUTPATIENT
Start: 2020-01-01 | End: 2020-12-17 | Stop reason: HOSPADM

## 2020-01-01 RX ORDER — SODIUM CHLORIDE 0.9 % (FLUSH) 0.9 %
10 SYRINGE (ML) INJECTION EVERY 12 HOURS SCHEDULED
Status: DISCONTINUED | OUTPATIENT
Start: 2020-01-01 | End: 2020-12-17 | Stop reason: HOSPADM

## 2020-01-01 RX ORDER — FENTANYL CITRATE 50 UG/ML
INJECTION, SOLUTION INTRAMUSCULAR; INTRAVENOUS
Status: COMPLETED | OUTPATIENT
Start: 2020-01-01 | End: 2020-01-01

## 2020-01-01 RX ORDER — PREDNISONE 20 MG/1
40 TABLET ORAL DAILY
Status: DISCONTINUED | OUTPATIENT
Start: 2020-01-01 | End: 2020-01-01

## 2020-01-01 RX ORDER — LORAZEPAM 2 MG/ML
0.5 INJECTION INTRAMUSCULAR
Status: DISCONTINUED | OUTPATIENT
Start: 2020-01-01 | End: 2020-12-17 | Stop reason: HOSPADM

## 2020-01-01 RX ORDER — ONDANSETRON 2 MG/ML
INJECTION INTRAMUSCULAR; INTRAVENOUS PRN
Status: DISCONTINUED | OUTPATIENT
Start: 2020-01-01 | End: 2020-01-01 | Stop reason: SDUPTHER

## 2020-01-01 RX ORDER — DEXAMETHASONE SODIUM PHOSPHATE 10 MG/ML
6 INJECTION, SOLUTION INTRAMUSCULAR; INTRAVENOUS DAILY
Status: DISCONTINUED | OUTPATIENT
Start: 2020-01-01 | End: 2020-01-01

## 2020-01-01 RX ORDER — VITAMIN B COMPLEX
2000 TABLET ORAL DAILY
Status: DISCONTINUED | OUTPATIENT
Start: 2020-01-01 | End: 2020-12-17 | Stop reason: HOSPADM

## 2020-01-01 RX ORDER — SODIUM CHLORIDE 9 MG/ML
INJECTION, SOLUTION INTRAVENOUS CONTINUOUS
Status: DISCONTINUED | OUTPATIENT
Start: 2020-01-01 | End: 2020-01-01 | Stop reason: HOSPADM

## 2020-01-01 RX ORDER — PANTOPRAZOLE SODIUM 40 MG/1
40 TABLET, DELAYED RELEASE ORAL DAILY
Status: DISCONTINUED | OUTPATIENT
Start: 2020-01-01 | End: 2020-12-17 | Stop reason: HOSPADM

## 2020-01-01 RX ORDER — HYDROCODONE BITARTRATE AND ACETAMINOPHEN 5; 325 MG/1; MG/1
1 TABLET ORAL EVERY 6 HOURS PRN
COMMUNITY
End: 2020-01-01 | Stop reason: ALTCHOICE

## 2020-01-01 RX ORDER — ALBUTEROL SULFATE 2.5 MG/3ML
2.5 SOLUTION RESPIRATORY (INHALATION) EVERY 4 HOURS PRN
Status: DISCONTINUED | OUTPATIENT
Start: 2020-01-01 | End: 2020-01-01 | Stop reason: HOSPADM

## 2020-01-01 RX ORDER — PANTOPRAZOLE SODIUM 40 MG/1
40 TABLET, DELAYED RELEASE ORAL DAILY
Status: DISCONTINUED | OUTPATIENT
Start: 2020-01-01 | End: 2020-01-01 | Stop reason: HOSPADM

## 2020-01-01 RX ORDER — NICOTINE 21 MG/24HR
1 PATCH, TRANSDERMAL 24 HOURS TRANSDERMAL DAILY PRN
Qty: 30 PATCH | Refills: 3 | Status: SHIPPED | OUTPATIENT
Start: 2020-01-01 | End: 2020-01-01

## 2020-01-01 RX ORDER — GLYCOPYRROLATE 1 MG/5 ML
0.2 SYRINGE (ML) INTRAVENOUS EVERY 4 HOURS PRN
Status: DISCONTINUED | OUTPATIENT
Start: 2020-01-01 | End: 2020-12-17 | Stop reason: HOSPADM

## 2020-01-01 RX ORDER — SODIUM CHLORIDE 0.9 % (FLUSH) 0.9 %
20 SYRINGE (ML) INJECTION PRN
Status: CANCELLED | OUTPATIENT
Start: 2020-01-01

## 2020-01-01 RX ORDER — SODIUM CHLORIDE 0.9 % (FLUSH) 0.9 %
10 SYRINGE (ML) INJECTION PRN
Status: DISCONTINUED | OUTPATIENT
Start: 2020-01-01 | End: 2020-01-01 | Stop reason: HOSPADM

## 2020-01-01 RX ORDER — SODIUM CHLORIDE 0.9 % (FLUSH) 0.9 %
10 SYRINGE (ML) INJECTION EVERY 12 HOURS SCHEDULED
Status: DISCONTINUED | OUTPATIENT
Start: 2020-01-01 | End: 2020-01-01 | Stop reason: HOSPADM

## 2020-01-01 RX ORDER — FOLIC ACID 1 MG/1
1 TABLET ORAL DAILY
Status: DISCONTINUED | OUTPATIENT
Start: 2020-01-01 | End: 2020-12-17 | Stop reason: HOSPADM

## 2020-01-01 RX ORDER — MORPHINE SULFATE 2 MG/ML
2 INJECTION, SOLUTION INTRAMUSCULAR; INTRAVENOUS EVERY 4 HOURS PRN
Status: DISCONTINUED | OUTPATIENT
Start: 2020-01-01 | End: 2020-01-01 | Stop reason: HOSPADM

## 2020-01-01 RX ORDER — ACETAMINOPHEN 650 MG/1
650 SUPPOSITORY RECTAL EVERY 6 HOURS PRN
Status: DISCONTINUED | OUTPATIENT
Start: 2020-01-01 | End: 2020-01-01 | Stop reason: HOSPADM

## 2020-01-01 RX ORDER — ACETAMINOPHEN 325 MG/1
650 TABLET ORAL ONCE
Status: DISCONTINUED | OUTPATIENT
Start: 2020-01-01 | End: 2020-01-01 | Stop reason: HOSPADM

## 2020-01-01 RX ORDER — OXYCODONE HYDROCHLORIDE 5 MG/1
5 TABLET ORAL EVERY 8 HOURS PRN
COMMUNITY
Start: 2020-01-01

## 2020-01-01 RX ORDER — SODIUM CHLORIDE 9 MG/ML
INJECTION, SOLUTION INTRAVENOUS CONTINUOUS
Status: DISCONTINUED | OUTPATIENT
Start: 2020-01-01 | End: 2020-12-17 | Stop reason: HOSPADM

## 2020-01-01 RX ORDER — FOLIC ACID 1 MG/1
1 TABLET ORAL DAILY
COMMUNITY

## 2020-01-01 RX ORDER — SODIUM CHLORIDE 0.9 % (FLUSH) 0.9 %
10 SYRINGE (ML) INJECTION PRN
Status: DISCONTINUED | OUTPATIENT
Start: 2020-01-01 | End: 2020-12-17 | Stop reason: HOSPADM

## 2020-01-01 RX ORDER — POTASSIUM CHLORIDE 20 MEQ/1
40 TABLET, EXTENDED RELEASE ORAL PRN
Status: DISCONTINUED | OUTPATIENT
Start: 2020-01-01 | End: 2020-01-01 | Stop reason: HOSPADM

## 2020-01-01 RX ORDER — ALBUTEROL SULFATE 2.5 MG/3ML
2.5 SOLUTION RESPIRATORY (INHALATION)
Status: DISCONTINUED | OUTPATIENT
Start: 2020-01-01 | End: 2020-12-17 | Stop reason: HOSPADM

## 2020-01-01 RX ORDER — MULTIVITAMIN WITH FOLIC ACID 400 MCG
1 TABLET ORAL DAILY
COMMUNITY

## 2020-01-01 RX ORDER — HYDROCODONE BITARTRATE AND ACETAMINOPHEN 5; 325 MG/1; MG/1
2 TABLET ORAL EVERY 4 HOURS PRN
Status: DISCONTINUED | OUTPATIENT
Start: 2020-01-01 | End: 2020-01-01 | Stop reason: HOSPADM

## 2020-01-01 RX ORDER — IPRATROPIUM BROMIDE AND ALBUTEROL SULFATE 2.5; .5 MG/3ML; MG/3ML
1 SOLUTION RESPIRATORY (INHALATION)
Status: DISCONTINUED | OUTPATIENT
Start: 2020-01-01 | End: 2020-12-17 | Stop reason: HOSPADM

## 2020-01-01 RX ORDER — MORPHINE SULFATE 4 MG/ML
4 INJECTION, SOLUTION INTRAMUSCULAR; INTRAVENOUS EVERY 30 MIN PRN
Status: DISCONTINUED | OUTPATIENT
Start: 2020-01-01 | End: 2020-12-17 | Stop reason: HOSPADM

## 2020-01-01 RX ORDER — DEXAMETHASONE SODIUM PHOSPHATE 10 MG/ML
INJECTION INTRAMUSCULAR; INTRAVENOUS PRN
Status: DISCONTINUED | OUTPATIENT
Start: 2020-01-01 | End: 2020-01-01 | Stop reason: SDUPTHER

## 2020-01-01 RX ORDER — FUROSEMIDE 10 MG/ML
20 INJECTION INTRAMUSCULAR; INTRAVENOUS 2 TIMES DAILY
Status: DISCONTINUED | OUTPATIENT
Start: 2020-01-01 | End: 2020-01-01

## 2020-01-01 RX ORDER — ACETAMINOPHEN 325 MG/1
650 TABLET ORAL EVERY 6 HOURS PRN
Status: DISCONTINUED | OUTPATIENT
Start: 2020-01-01 | End: 2020-01-01 | Stop reason: HOSPADM

## 2020-01-01 RX ORDER — HEPARIN SODIUM (PORCINE) LOCK FLUSH IV SOLN 100 UNIT/ML 100 UNIT/ML
500 SOLUTION INTRAVENOUS PRN
Status: DISCONTINUED | OUTPATIENT
Start: 2020-01-01 | End: 2020-01-01 | Stop reason: HOSPADM

## 2020-01-01 RX ORDER — ACETAMINOPHEN 160 MG
1 TABLET,DISINTEGRATING ORAL DAILY
COMMUNITY

## 2020-01-01 RX ORDER — MIDAZOLAM HYDROCHLORIDE 1 MG/ML
INJECTION INTRAMUSCULAR; INTRAVENOUS PRN
Status: DISCONTINUED | OUTPATIENT
Start: 2020-01-01 | End: 2020-01-01 | Stop reason: SDUPTHER

## 2020-01-01 RX ORDER — SODIUM CHLORIDE 0.9 % (FLUSH) 0.9 %
10 SYRINGE (ML) INJECTION PRN
Status: DISCONTINUED | OUTPATIENT
Start: 2020-01-01 | End: 2020-01-01 | Stop reason: SDUPTHER

## 2020-01-01 RX ORDER — OXYCODONE HYDROCHLORIDE 5 MG/1
5 TABLET ORAL EVERY 8 HOURS PRN
Status: DISCONTINUED | OUTPATIENT
Start: 2020-01-01 | End: 2020-12-17 | Stop reason: HOSPADM

## 2020-01-01 RX ORDER — FENTANYL CITRATE 50 UG/ML
25 INJECTION, SOLUTION INTRAMUSCULAR; INTRAVENOUS EVERY 5 MIN PRN
Status: DISCONTINUED | OUTPATIENT
Start: 2020-01-01 | End: 2020-01-01 | Stop reason: HOSPADM

## 2020-01-01 RX ORDER — ACETAMINOPHEN 325 MG/1
650 TABLET ORAL EVERY 6 HOURS PRN
Status: DISCONTINUED | OUTPATIENT
Start: 2020-01-01 | End: 2020-12-17 | Stop reason: HOSPADM

## 2020-01-01 RX ORDER — IPRATROPIUM BROMIDE AND ALBUTEROL SULFATE 2.5; .5 MG/3ML; MG/3ML
1 SOLUTION RESPIRATORY (INHALATION) ONCE
Status: COMPLETED | OUTPATIENT
Start: 2020-01-01 | End: 2020-01-01

## 2020-01-01 RX ORDER — FUROSEMIDE 10 MG/ML
40 INJECTION INTRAMUSCULAR; INTRAVENOUS ONCE
Status: COMPLETED | OUTPATIENT
Start: 2020-01-01 | End: 2020-01-01

## 2020-01-01 RX ORDER — HYDROCODONE BITARTRATE AND ACETAMINOPHEN 5; 325 MG/1; MG/1
1 TABLET ORAL EVERY 4 HOURS PRN
Status: DISCONTINUED | OUTPATIENT
Start: 2020-01-01 | End: 2020-01-01 | Stop reason: HOSPADM

## 2020-01-01 RX ORDER — SODIUM CHLORIDE FOR INHALATION 0.9 %
3 VIAL, NEBULIZER (ML) INHALATION EVERY 8 HOURS PRN
Status: DISCONTINUED | OUTPATIENT
Start: 2020-01-01 | End: 2020-01-01 | Stop reason: HOSPADM

## 2020-01-01 RX ORDER — FUROSEMIDE 10 MG/ML
20 INJECTION INTRAMUSCULAR; INTRAVENOUS DAILY
Status: DISCONTINUED | OUTPATIENT
Start: 2020-01-01 | End: 2020-01-01

## 2020-01-01 RX ORDER — MORPHINE SULFATE 4 MG/ML
4 INJECTION, SOLUTION INTRAMUSCULAR; INTRAVENOUS ONCE
Status: COMPLETED | OUTPATIENT
Start: 2020-01-01 | End: 2020-01-01

## 2020-01-01 RX ORDER — ONDANSETRON 2 MG/ML
4 INJECTION INTRAMUSCULAR; INTRAVENOUS EVERY 6 HOURS PRN
Status: DISCONTINUED | OUTPATIENT
Start: 2020-01-01 | End: 2020-12-17 | Stop reason: HOSPADM

## 2020-01-01 RX ORDER — SODIUM CHLORIDE 0.9 % (FLUSH) 0.9 %
20 SYRINGE (ML) INJECTION PRN
Status: DISCONTINUED | OUTPATIENT
Start: 2020-01-01 | End: 2020-01-01 | Stop reason: HOSPADM

## 2020-01-01 RX ORDER — FUROSEMIDE 10 MG/ML
20 INJECTION INTRAMUSCULAR; INTRAVENOUS ONCE
Status: COMPLETED | OUTPATIENT
Start: 2020-01-01 | End: 2020-01-01

## 2020-01-01 RX ORDER — ALBUTEROL SULFATE 90 UG/1
2 AEROSOL, METERED RESPIRATORY (INHALATION) EVERY 6 HOURS PRN
Qty: 1 INHALER | Refills: 3 | Status: SHIPPED | OUTPATIENT
Start: 2020-01-01

## 2020-01-01 RX ORDER — PROMETHAZINE HYDROCHLORIDE 12.5 MG/1
12.5 TABLET ORAL EVERY 6 HOURS PRN
Status: DISCONTINUED | OUTPATIENT
Start: 2020-01-01 | End: 2020-12-17 | Stop reason: HOSPADM

## 2020-01-01 RX ORDER — POTASSIUM CHLORIDE 7.45 MG/ML
10 INJECTION INTRAVENOUS PRN
Status: DISCONTINUED | OUTPATIENT
Start: 2020-01-01 | End: 2020-12-17 | Stop reason: HOSPADM

## 2020-01-01 RX ORDER — ONDANSETRON 2 MG/ML
4 INJECTION INTRAMUSCULAR; INTRAVENOUS
Status: DISCONTINUED | OUTPATIENT
Start: 2020-01-01 | End: 2020-01-01 | Stop reason: HOSPADM

## 2020-01-01 RX ORDER — LEVALBUTEROL INHALATION SOLUTION 0.63 MG/3ML
0.63 SOLUTION RESPIRATORY (INHALATION) EVERY 8 HOURS PRN
Status: DISCONTINUED | OUTPATIENT
Start: 2020-01-01 | End: 2020-01-01

## 2020-01-01 RX ORDER — LORAZEPAM 2 MG/ML
0.5 INJECTION INTRAMUSCULAR
Status: DISCONTINUED | OUTPATIENT
Start: 2020-01-01 | End: 2020-01-01

## 2020-01-01 RX ORDER — PANTOPRAZOLE SODIUM 40 MG/1
40 TABLET, DELAYED RELEASE ORAL DAILY
COMMUNITY

## 2020-01-01 RX ORDER — TRAMADOL HYDROCHLORIDE 50 MG/1
50 TABLET ORAL EVERY 6 HOURS PRN
Qty: 12 TABLET | Refills: 0 | Status: SHIPPED | OUTPATIENT
Start: 2020-01-01 | End: 2020-01-01

## 2020-01-01 RX ORDER — IPRATROPIUM BROMIDE AND ALBUTEROL SULFATE 2.5; .5 MG/3ML; MG/3ML
SOLUTION RESPIRATORY (INHALATION)
Status: COMPLETED
Start: 2020-01-01 | End: 2020-01-01

## 2020-01-01 RX ORDER — ALBUTEROL SULFATE 90 UG/1
2 AEROSOL, METERED RESPIRATORY (INHALATION) EVERY 6 HOURS PRN
Status: ON HOLD | COMMUNITY
End: 2020-01-01 | Stop reason: SDUPTHER

## 2020-01-01 RX ORDER — SUCCINYLCHOLINE CHLORIDE 20 MG/ML
INJECTION INTRAMUSCULAR; INTRAVENOUS PRN
Status: DISCONTINUED | OUTPATIENT
Start: 2020-01-01 | End: 2020-01-01 | Stop reason: SDUPTHER

## 2020-01-01 RX ORDER — FENTANYL CITRATE 50 UG/ML
INJECTION, SOLUTION INTRAMUSCULAR; INTRAVENOUS PRN
Status: DISCONTINUED | OUTPATIENT
Start: 2020-01-01 | End: 2020-01-01 | Stop reason: SDUPTHER

## 2020-01-01 RX ORDER — PROMETHAZINE HYDROCHLORIDE 12.5 MG/1
12.5 TABLET ORAL EVERY 6 HOURS PRN
Status: DISCONTINUED | OUTPATIENT
Start: 2020-01-01 | End: 2020-01-01 | Stop reason: HOSPADM

## 2020-01-01 RX ORDER — M-VIT,TX,IRON,MINS/CALC/FOLIC 27MG-0.4MG
1 TABLET ORAL DAILY
Status: DISCONTINUED | OUTPATIENT
Start: 2020-01-01 | End: 2020-12-17 | Stop reason: HOSPADM

## 2020-01-01 RX ORDER — SODIUM CHLORIDE 9 MG/ML
INJECTION, SOLUTION INTRAVENOUS CONTINUOUS
Status: CANCELLED | OUTPATIENT
Start: 2020-01-01

## 2020-01-01 RX ORDER — SODIUM CHLORIDE 0.9 % (FLUSH) 0.9 %
10 SYRINGE (ML) INJECTION ONCE
Status: COMPLETED | OUTPATIENT
Start: 2020-01-01 | End: 2020-01-01

## 2020-01-01 RX ORDER — PROPOFOL 10 MG/ML
INJECTION, EMULSION INTRAVENOUS PRN
Status: DISCONTINUED | OUTPATIENT
Start: 2020-01-01 | End: 2020-01-01 | Stop reason: SDUPTHER

## 2020-01-01 RX ORDER — ONDANSETRON 2 MG/ML
4 INJECTION INTRAMUSCULAR; INTRAVENOUS ONCE
Status: COMPLETED | OUTPATIENT
Start: 2020-01-01 | End: 2020-01-01

## 2020-01-01 RX ORDER — SODIUM CHLORIDE 9 MG/ML
INJECTION, SOLUTION INTRAVENOUS CONTINUOUS
Status: DISCONTINUED | OUTPATIENT
Start: 2020-01-01 | End: 2020-01-01

## 2020-01-01 RX ORDER — HEPARIN SODIUM (PORCINE) LOCK FLUSH IV SOLN 100 UNIT/ML 100 UNIT/ML
500 SOLUTION INTRAVENOUS PRN
Status: CANCELLED | OUTPATIENT
Start: 2020-01-01

## 2020-01-01 RX ORDER — ONDANSETRON 2 MG/ML
4 INJECTION INTRAMUSCULAR; INTRAVENOUS EVERY 6 HOURS PRN
Status: DISCONTINUED | OUTPATIENT
Start: 2020-01-01 | End: 2020-01-01 | Stop reason: HOSPADM

## 2020-01-01 RX ADMIN — Medication 20 ML: at 14:59

## 2020-01-01 RX ADMIN — SODIUM CHLORIDE, PRESERVATIVE FREE 10 ML: 5 INJECTION INTRAVENOUS at 08:54

## 2020-01-01 RX ADMIN — MAGNESIUM SULFATE HEPTAHYDRATE 1 G: 1 INJECTION, SOLUTION INTRAVENOUS at 11:55

## 2020-01-01 RX ADMIN — IPRATROPIUM BROMIDE AND ALBUTEROL SULFATE 1 AMPULE: .5; 3 SOLUTION RESPIRATORY (INHALATION) at 11:49

## 2020-01-01 RX ADMIN — SODIUM CHLORIDE 0.8 MCG/KG/HR: 9 INJECTION, SOLUTION INTRAVENOUS at 07:28

## 2020-01-01 RX ADMIN — Medication 10 ML: at 21:41

## 2020-01-01 RX ADMIN — AZITHROMYCIN MONOHYDRATE 500 MG: 500 INJECTION, POWDER, LYOPHILIZED, FOR SOLUTION INTRAVENOUS at 23:20

## 2020-01-01 RX ADMIN — Medication 20 ML: at 10:15

## 2020-01-01 RX ADMIN — HEPARIN 500 UNITS: 100 SYRINGE at 15:00

## 2020-01-01 RX ADMIN — SODIUM CHLORIDE 80 ML: 9 INJECTION, SOLUTION INTRAVENOUS at 15:39

## 2020-01-01 RX ADMIN — ENOXAPARIN SODIUM 40 MG: 40 INJECTION SUBCUTANEOUS at 19:40

## 2020-01-01 RX ADMIN — IPRATROPIUM BROMIDE AND ALBUTEROL SULFATE 1 AMPULE: .5; 3 SOLUTION RESPIRATORY (INHALATION) at 07:35

## 2020-01-01 RX ADMIN — MULTIPLE VITAMINS W/ MINERALS TAB 1 TABLET: TAB at 08:42

## 2020-01-01 RX ADMIN — BUDESONIDE AND FORMOTEROL FUMARATE DIHYDRATE 2 PUFF: 160; 4.5 AEROSOL RESPIRATORY (INHALATION) at 09:33

## 2020-01-01 RX ADMIN — OXYCODONE HYDROCHLORIDE 5 MG: 5 TABLET ORAL at 17:44

## 2020-01-01 RX ADMIN — HEPARIN SODIUM 5000 UNITS: 5000 INJECTION INTRAVENOUS; SUBCUTANEOUS at 08:55

## 2020-01-01 RX ADMIN — SODIUM CHLORIDE 1000 ML: 9 INJECTION, SOLUTION INTRAVENOUS at 01:42

## 2020-01-01 RX ADMIN — FUROSEMIDE 20 MG: 10 INJECTION, SOLUTION INTRAMUSCULAR; INTRAVENOUS at 19:41

## 2020-01-01 RX ADMIN — LEVALBUTEROL 1.25 MG: 1.25 SOLUTION, CONCENTRATE RESPIRATORY (INHALATION) at 09:32

## 2020-01-01 RX ADMIN — MORPHINE SULFATE 2 MG: 2 INJECTION, SOLUTION INTRAMUSCULAR; INTRAVENOUS at 17:38

## 2020-01-01 RX ADMIN — IPRATROPIUM BROMIDE AND ALBUTEROL SULFATE 1 AMPULE: .5; 3 SOLUTION RESPIRATORY (INHALATION) at 08:05

## 2020-01-01 RX ADMIN — HYDROCODONE BITARTRATE AND ACETAMINOPHEN 1 TABLET: 5; 325 TABLET ORAL at 02:51

## 2020-01-01 RX ADMIN — LIDOCAINE HYDROCHLORIDE 100 MG: 20 INJECTION, SOLUTION EPIDURAL; INFILTRATION; INTRACAUDAL; PERINEURAL at 13:27

## 2020-01-01 RX ADMIN — PIPERACILLIN SODIUM AND TAZOBACTAM SODIUM 4.5 G: 4; .5 INJECTION, POWDER, LYOPHILIZED, FOR SOLUTION INTRAVENOUS at 15:10

## 2020-01-01 RX ADMIN — SODIUM CHLORIDE: 9 INJECTION, SOLUTION INTRAVENOUS at 09:16

## 2020-01-01 RX ADMIN — SUCCINYLCHOLINE CHLORIDE 100 MG: 20 INJECTION, SOLUTION INTRAMUSCULAR; INTRAVENOUS at 13:27

## 2020-01-01 RX ADMIN — SODIUM CHLORIDE: 9 INJECTION, SOLUTION INTRAVENOUS at 11:54

## 2020-01-01 RX ADMIN — SODIUM CHLORIDE, PRESERVATIVE FREE 10 ML: 5 INJECTION INTRAVENOUS at 12:46

## 2020-01-01 RX ADMIN — CEFTRIAXONE SODIUM 1 G: 1 INJECTION, POWDER, FOR SOLUTION INTRAMUSCULAR; INTRAVENOUS at 23:18

## 2020-01-01 RX ADMIN — Medication 10 ML: at 19:49

## 2020-01-01 RX ADMIN — METHYLPREDNISOLONE SODIUM SUCCINATE 40 MG: 40 INJECTION, POWDER, FOR SOLUTION INTRAMUSCULAR; INTRAVENOUS at 15:17

## 2020-01-01 RX ADMIN — BUDESONIDE AND FORMOTEROL FUMARATE DIHYDRATE 2 PUFF: 160; 4.5 AEROSOL RESPIRATORY (INHALATION) at 07:35

## 2020-01-01 RX ADMIN — SODIUM CHLORIDE, PRESERVATIVE FREE 10 ML: 5 INJECTION INTRAVENOUS at 23:01

## 2020-01-01 RX ADMIN — BUDESONIDE AND FORMOTEROL FUMARATE DIHYDRATE 2 PUFF: 160; 4.5 AEROSOL RESPIRATORY (INHALATION) at 09:22

## 2020-01-01 RX ADMIN — HEPARIN SODIUM 5000 UNITS: 5000 INJECTION INTRAVENOUS; SUBCUTANEOUS at 21:48

## 2020-01-01 RX ADMIN — LEVALBUTEROL 1.25 MG: 1.25 SOLUTION, CONCENTRATE RESPIRATORY (INHALATION) at 20:36

## 2020-01-01 RX ADMIN — HYDROCODONE BITARTRATE AND ACETAMINOPHEN 2 TABLET: 5; 325 TABLET ORAL at 13:10

## 2020-01-01 RX ADMIN — PANTOPRAZOLE SODIUM 40 MG: 40 TABLET, DELAYED RELEASE ORAL at 19:41

## 2020-01-01 RX ADMIN — MAGNESIUM SULFATE HEPTAHYDRATE 1 G: 1 INJECTION, SOLUTION INTRAVENOUS at 13:48

## 2020-01-01 RX ADMIN — FOLIC ACID 1 MG: 1 TABLET ORAL at 17:44

## 2020-01-01 RX ADMIN — ONDANSETRON 4 MG: 2 INJECTION INTRAMUSCULAR; INTRAVENOUS at 16:34

## 2020-01-01 RX ADMIN — Medication 10 ML: at 08:52

## 2020-01-01 RX ADMIN — Medication 3 ML: at 02:52

## 2020-01-01 RX ADMIN — MORPHINE SULFATE 2 MG: 2 INJECTION, SOLUTION INTRAMUSCULAR; INTRAVENOUS at 08:54

## 2020-01-01 RX ADMIN — FOLIC ACID 1 MG: 1 TABLET ORAL at 08:42

## 2020-01-01 RX ADMIN — MAGNESIUM SULFATE HEPTAHYDRATE 1 G: 1 INJECTION, SOLUTION INTRAVENOUS at 08:52

## 2020-01-01 RX ADMIN — PROPOFOL 150 MG: 10 INJECTION, EMULSION INTRAVENOUS at 13:27

## 2020-01-01 RX ADMIN — IOPAMIDOL 75 ML: 755 INJECTION, SOLUTION INTRAVENOUS at 15:39

## 2020-01-01 RX ADMIN — LORAZEPAM 1 MG: 2 INJECTION, SOLUTION INTRAMUSCULAR; INTRAVENOUS at 16:53

## 2020-01-01 RX ADMIN — BUDESONIDE AND FORMOTEROL FUMARATE DIHYDRATE 2 PUFF: 160; 4.5 AEROSOL RESPIRATORY (INHALATION) at 08:17

## 2020-01-01 RX ADMIN — BUDESONIDE AND FORMOTEROL FUMARATE DIHYDRATE 2 PUFF: 160; 4.5 AEROSOL RESPIRATORY (INHALATION) at 20:41

## 2020-01-01 RX ADMIN — Medication 2000 UNITS: at 17:44

## 2020-01-01 RX ADMIN — ENOXAPARIN SODIUM 40 MG: 40 INJECTION SUBCUTANEOUS at 19:51

## 2020-01-01 RX ADMIN — INSULIN LISPRO 1 UNITS: 100 INJECTION, SOLUTION INTRAVENOUS; SUBCUTANEOUS at 21:48

## 2020-01-01 RX ADMIN — HYDROCODONE BITARTRATE AND ACETAMINOPHEN 2 TABLET: 5; 325 TABLET ORAL at 19:13

## 2020-01-01 RX ADMIN — Medication 50 MCG: at 13:27

## 2020-01-01 RX ADMIN — ALPRAZOLAM 0.25 MG: 0.25 TABLET ORAL at 10:52

## 2020-01-01 RX ADMIN — PANTOPRAZOLE SODIUM 40 MG: 40 TABLET, DELAYED RELEASE ORAL at 17:44

## 2020-01-01 RX ADMIN — MULTIPLE VITAMINS W/ MINERALS TAB 1 TABLET: TAB at 08:55

## 2020-01-01 RX ADMIN — CEFTRIAXONE SODIUM 1 G: 1 INJECTION, POWDER, FOR SOLUTION INTRAMUSCULAR; INTRAVENOUS at 21:46

## 2020-01-01 RX ADMIN — IOPAMIDOL 75 ML: 755 INJECTION, SOLUTION INTRAVENOUS at 21:40

## 2020-01-01 RX ADMIN — LEVALBUTEROL 1.25 MG: 1.25 SOLUTION, CONCENTRATE RESPIRATORY (INHALATION) at 14:39

## 2020-01-01 RX ADMIN — OXYCODONE HYDROCHLORIDE 5 MG: 5 TABLET ORAL at 01:47

## 2020-01-01 RX ADMIN — LEVALBUTEROL 1.25 MG: 1.25 SOLUTION, CONCENTRATE RESPIRATORY (INHALATION) at 15:34

## 2020-01-01 RX ADMIN — MORPHINE SULFATE 2 MG: 2 INJECTION, SOLUTION INTRAMUSCULAR; INTRAVENOUS at 16:53

## 2020-01-01 RX ADMIN — SODIUM CHLORIDE, PRESERVATIVE FREE 10 ML: 5 INJECTION INTRAVENOUS at 21:52

## 2020-01-01 RX ADMIN — FUROSEMIDE 40 MG: 10 INJECTION, SOLUTION INTRAMUSCULAR; INTRAVENOUS at 17:44

## 2020-01-01 RX ADMIN — LEVALBUTEROL 1.25 MG: 1.25 SOLUTION, CONCENTRATE RESPIRATORY (INHALATION) at 15:11

## 2020-01-01 RX ADMIN — LEVALBUTEROL 1.25 MG: 1.25 SOLUTION, CONCENTRATE RESPIRATORY (INHALATION) at 19:09

## 2020-01-01 RX ADMIN — Medication 10 ML: at 15:39

## 2020-01-01 RX ADMIN — SODIUM CHLORIDE 0.5 MCG/KG/HR: 9 INJECTION, SOLUTION INTRAVENOUS at 15:02

## 2020-01-01 RX ADMIN — Medication 150 MEQ: at 14:19

## 2020-01-01 RX ADMIN — BUDESONIDE AND FORMOTEROL FUMARATE DIHYDRATE 2 PUFF: 160; 4.5 AEROSOL RESPIRATORY (INHALATION) at 19:09

## 2020-01-01 RX ADMIN — SODIUM CHLORIDE: 9 INJECTION, SOLUTION INTRAVENOUS at 16:41

## 2020-01-01 RX ADMIN — PANTOPRAZOLE SODIUM 40 MG: 40 TABLET, DELAYED RELEASE ORAL at 13:10

## 2020-01-01 RX ADMIN — LEVALBUTEROL 1.25 MG: 1.25 SOLUTION, CONCENTRATE RESPIRATORY (INHALATION) at 02:50

## 2020-01-01 RX ADMIN — FENTANYL CITRATE 50 MCG: 50 INJECTION INTRAMUSCULAR; INTRAVENOUS at 10:33

## 2020-01-01 RX ADMIN — IPRATROPIUM BROMIDE AND ALBUTEROL SULFATE 1 AMPULE: .5; 3 SOLUTION RESPIRATORY (INHALATION) at 20:41

## 2020-01-01 RX ADMIN — HYDROCODONE BITARTRATE AND ACETAMINOPHEN 2 TABLET: 5; 325 TABLET ORAL at 12:46

## 2020-01-01 RX ADMIN — DEXTROSE MONOHYDRATE 2 MCG/MIN: 50 INJECTION, SOLUTION INTRAVENOUS at 16:17

## 2020-01-01 RX ADMIN — SODIUM CHLORIDE 80 ML: 9 INJECTION, SOLUTION INTRAVENOUS at 09:09

## 2020-01-01 RX ADMIN — IPRATROPIUM BROMIDE AND ALBUTEROL SULFATE 1 AMPULE: .5; 3 SOLUTION RESPIRATORY (INHALATION) at 14:34

## 2020-01-01 RX ADMIN — SODIUM CHLORIDE 500 ML: 9 INJECTION, SOLUTION INTRAVENOUS at 10:15

## 2020-01-01 RX ADMIN — METHYLPREDNISOLONE SODIUM SUCCINATE 40 MG: 40 INJECTION, POWDER, FOR SOLUTION INTRAMUSCULAR; INTRAVENOUS at 09:29

## 2020-01-01 RX ADMIN — PANTOPRAZOLE SODIUM 40 MG: 40 TABLET, DELAYED RELEASE ORAL at 08:42

## 2020-01-01 RX ADMIN — SODIUM CHLORIDE, PRESERVATIVE FREE 10 ML: 5 INJECTION INTRAVENOUS at 20:28

## 2020-01-01 RX ADMIN — SODIUM CHLORIDE 500 ML: 9 INJECTION, SOLUTION INTRAVENOUS at 11:42

## 2020-01-01 RX ADMIN — MIDAZOLAM 1 MG: 1 INJECTION INTRAMUSCULAR; INTRAVENOUS at 10:33

## 2020-01-01 RX ADMIN — DEXAMETHASONE SODIUM PHOSPHATE 6 MG: 10 INJECTION, SOLUTION INTRAMUSCULAR; INTRAVENOUS at 16:53

## 2020-01-01 RX ADMIN — INSULIN LISPRO 4 UNITS: 100 INJECTION, SOLUTION INTRAVENOUS; SUBCUTANEOUS at 16:54

## 2020-01-01 RX ADMIN — Medication 3 ML: at 19:09

## 2020-01-01 RX ADMIN — SODIUM CHLORIDE, POTASSIUM CHLORIDE, SODIUM LACTATE AND CALCIUM CHLORIDE: 600; 310; 30; 20 INJECTION, SOLUTION INTRAVENOUS at 13:32

## 2020-01-01 RX ADMIN — IPRATROPIUM BROMIDE AND ALBUTEROL SULFATE 1 AMPULE: 2.5; .5 SOLUTION RESPIRATORY (INHALATION) at 14:23

## 2020-01-01 RX ADMIN — DEXAMETHASONE SODIUM PHOSPHATE 10 MG: 10 INJECTION INTRAMUSCULAR; INTRAVENOUS at 13:36

## 2020-01-01 RX ADMIN — BUDESONIDE AND FORMOTEROL FUMARATE DIHYDRATE 2 PUFF: 160; 4.5 AEROSOL RESPIRATORY (INHALATION) at 20:50

## 2020-01-01 RX ADMIN — FUROSEMIDE 20 MG: 10 INJECTION, SOLUTION INTRAMUSCULAR; INTRAVENOUS at 12:58

## 2020-01-01 RX ADMIN — MIDAZOLAM 2 MG: 1 INJECTION INTRAMUSCULAR; INTRAVENOUS at 13:19

## 2020-01-01 RX ADMIN — LEVALBUTEROL 1.25 MG: 1.25 SOLUTION, CONCENTRATE RESPIRATORY (INHALATION) at 09:55

## 2020-01-01 RX ADMIN — OXYCODONE HYDROCHLORIDE 5 MG: 5 TABLET ORAL at 11:55

## 2020-01-01 RX ADMIN — LEVALBUTEROL 1.25 MG: 1.25 SOLUTION, CONCENTRATE RESPIRATORY (INHALATION) at 09:20

## 2020-01-01 RX ADMIN — LORAZEPAM 1 MG: 2 INJECTION, SOLUTION INTRAMUSCULAR; INTRAVENOUS at 19:31

## 2020-01-01 RX ADMIN — IPRATROPIUM BROMIDE AND ALBUTEROL SULFATE 1 AMPULE: .5; 3 SOLUTION RESPIRATORY (INHALATION) at 14:23

## 2020-01-01 RX ADMIN — LEVALBUTEROL 1.25 MG: 1.25 SOLUTION, CONCENTRATE RESPIRATORY (INHALATION) at 20:50

## 2020-01-01 RX ADMIN — AZITHROMYCIN MONOHYDRATE 500 MG: 500 INJECTION, POWDER, LYOPHILIZED, FOR SOLUTION INTRAVENOUS at 22:46

## 2020-01-01 RX ADMIN — POTASSIUM BICARBONATE 40 MEQ: 782 TABLET, EFFERVESCENT ORAL at 08:52

## 2020-01-01 RX ADMIN — Medication 10 ML: at 09:09

## 2020-01-01 RX ADMIN — MORPHINE SULFATE 4 MG: 4 INJECTION, SOLUTION INTRAMUSCULAR; INTRAVENOUS at 19:31

## 2020-01-01 RX ADMIN — BUDESONIDE AND FORMOTEROL FUMARATE DIHYDRATE 2 PUFF: 160; 4.5 AEROSOL RESPIRATORY (INHALATION) at 09:55

## 2020-01-01 RX ADMIN — SODIUM CHLORIDE 80 ML: 9 INJECTION, SOLUTION INTRAVENOUS at 21:41

## 2020-01-01 RX ADMIN — MULTIPLE VITAMINS W/ MINERALS TAB 1 TABLET: TAB at 17:44

## 2020-01-01 RX ADMIN — DIPHENHYDRAMINE HYDROCHLORIDE 25 MG: 25 TABLET ORAL at 10:08

## 2020-01-01 RX ADMIN — MORPHINE SULFATE 4 MG: 4 INJECTION, SOLUTION INTRAMUSCULAR; INTRAVENOUS at 16:34

## 2020-01-01 RX ADMIN — Medication 10 ML: at 19:53

## 2020-01-01 RX ADMIN — ONDANSETRON 4 MG: 2 INJECTION, SOLUTION INTRAMUSCULAR; INTRAVENOUS at 13:27

## 2020-01-01 RX ADMIN — SODIUM CHLORIDE: 9 INJECTION, SOLUTION INTRAVENOUS at 09:47

## 2020-01-01 RX ADMIN — IOPAMIDOL 75 ML: 755 INJECTION, SOLUTION INTRAVENOUS at 09:06

## 2020-01-01 RX ADMIN — HYDROCODONE BITARTRATE AND ACETAMINOPHEN 2 TABLET: 5; 325 TABLET ORAL at 21:44

## 2020-01-01 RX ADMIN — PANTOPRAZOLE SODIUM 40 MG: 40 TABLET, DELAYED RELEASE ORAL at 12:45

## 2020-01-01 RX ADMIN — MAGNESIUM SULFATE HEPTAHYDRATE 1 G: 1 INJECTION, SOLUTION INTRAVENOUS at 10:53

## 2020-01-01 RX ADMIN — IPRATROPIUM BROMIDE AND ALBUTEROL SULFATE 1 AMPULE: .5; 3 SOLUTION RESPIRATORY (INHALATION) at 11:44

## 2020-01-01 RX ADMIN — SODIUM CHLORIDE 1000 ML: 9 INJECTION, SOLUTION INTRAVENOUS at 09:45

## 2020-01-01 RX ADMIN — Medication 2000 UNITS: at 08:42

## 2020-01-01 RX ADMIN — HYDROCODONE BITARTRATE AND ACETAMINOPHEN 2 TABLET: 5; 325 TABLET ORAL at 05:54

## 2020-01-01 RX ADMIN — BUDESONIDE AND FORMOTEROL FUMARATE DIHYDRATE 2 PUFF: 160; 4.5 AEROSOL RESPIRATORY (INHALATION) at 20:36

## 2020-01-01 ASSESSMENT — PAIN DESCRIPTION - PROGRESSION
CLINICAL_PROGRESSION: NOT CHANGED

## 2020-01-01 ASSESSMENT — PAIN SCALES - GENERAL
PAINLEVEL_OUTOF10: 5
PAINLEVEL_OUTOF10: 8
PAINLEVEL_OUTOF10: 0
PAINLEVEL_OUTOF10: 7
PAINLEVEL_OUTOF10: 0
PAINLEVEL_OUTOF10: 5
PAINLEVEL_OUTOF10: 0
PAINLEVEL_OUTOF10: 7
PAINLEVEL_OUTOF10: 8
PAINLEVEL_OUTOF10: 7
PAINLEVEL_OUTOF10: 0
PAINLEVEL_OUTOF10: 4
PAINLEVEL_OUTOF10: 8
PAINLEVEL_OUTOF10: 5
PAINLEVEL_OUTOF10: 5
PAINLEVEL_OUTOF10: 6
PAINLEVEL_OUTOF10: 0
PAINLEVEL_OUTOF10: 8
PAINLEVEL_OUTOF10: 7
PAINLEVEL_OUTOF10: 5
PAINLEVEL_OUTOF10: 10
PAINLEVEL_OUTOF10: 0
PAINLEVEL_OUTOF10: 0
PAINLEVEL_OUTOF10: 5
PAINLEVEL_OUTOF10: 8
PAINLEVEL_OUTOF10: 7
PAINLEVEL_OUTOF10: 2
PAINLEVEL_OUTOF10: 6

## 2020-01-01 ASSESSMENT — PAIN DESCRIPTION - DESCRIPTORS
DESCRIPTORS: CONSTANT
DESCRIPTORS: SORE;SHARP
DESCRIPTORS: ACHING;SORE
DESCRIPTORS: ACHING;CRUSHING
DESCRIPTORS: SORE
DESCRIPTORS: ACHING
DESCRIPTORS: ACHING
DESCRIPTORS: ACHING;SORE

## 2020-01-01 ASSESSMENT — PAIN DESCRIPTION - LOCATION
LOCATION: CHEST;BACK
LOCATION: CHEST
LOCATION: SHOULDER
LOCATION: CHEST;ARM
LOCATION: CHEST

## 2020-01-01 ASSESSMENT — PULMONARY FUNCTION TESTS
PIF_VALUE: 24
PIF_VALUE: 15
PIF_VALUE: 24
PIF_VALUE: 21
PIF_VALUE: 24
PIF_VALUE: 22
PIF_VALUE: 22
PIF_VALUE: 0
PIF_VALUE: 15
PIF_VALUE: 21
PIF_VALUE: 9
PIF_VALUE: 21
PIF_VALUE: 20
PIF_VALUE: 23
PIF_VALUE: 18
PIF_VALUE: 0
PIF_VALUE: 2
PIF_VALUE: 2
PIF_VALUE: 20
PIF_VALUE: 22
PIF_VALUE: 25
PIF_VALUE: 22
PIF_VALUE: 18
PIF_VALUE: 2
PIF_VALUE: 20
PIF_VALUE: 20
PIF_VALUE: 24
PIF_VALUE: 2
PIF_VALUE: 21
PIF_VALUE: 20
PIF_VALUE: 1
PIF_VALUE: 2

## 2020-01-01 ASSESSMENT — ENCOUNTER SYMPTOMS
FACIAL SWELLING: 0
COUGH: 0
DIARRHEA: 0
COLOR CHANGE: 0
CONSTIPATION: 0
SINUS PRESSURE: 0
VOMITING: 0
BLOOD IN STOOL: 0
SHORTNESS OF BREATH: 1
ABDOMINAL PAIN: 0
BACK PAIN: 1
VOMITING: 0
CONSTIPATION: 0
BACK PAIN: 1
DIARRHEA: 0
SHORTNESS OF BREATH: 1
COUGH: 1
NAUSEA: 0
SHORTNESS OF BREATH: 1
CONSTIPATION: 0
DIARRHEA: 0
NAUSEA: 0
VOMITING: 0
EYE REDNESS: 0
EYE DISCHARGE: 0
WHEEZING: 0
ABDOMINAL PAIN: 0
COLOR CHANGE: 0

## 2020-01-01 ASSESSMENT — PAIN DESCRIPTION - PAIN TYPE
TYPE: ACUTE PAIN
TYPE: ACUTE PAIN
TYPE: CHRONIC PAIN
TYPE: ACUTE PAIN
TYPE: CHRONIC PAIN
TYPE: ACUTE PAIN

## 2020-01-01 ASSESSMENT — PAIN DESCRIPTION - ORIENTATION
ORIENTATION: LEFT
ORIENTATION: LEFT;OUTER;UPPER
ORIENTATION: RIGHT
ORIENTATION: RIGHT
ORIENTATION: LEFT;OUTER;UPPER
ORIENTATION: UPPER
ORIENTATION: LEFT;INNER;OUTER

## 2020-01-01 ASSESSMENT — PAIN DESCRIPTION - FREQUENCY
FREQUENCY: INTERMITTENT
FREQUENCY: CONTINUOUS
FREQUENCY: INTERMITTENT

## 2020-01-01 ASSESSMENT — PAIN - FUNCTIONAL ASSESSMENT
PAIN_FUNCTIONAL_ASSESSMENT: ACTIVITIES ARE NOT PREVENTED
PAIN_FUNCTIONAL_ASSESSMENT: ACTIVITIES ARE NOT PREVENTED
PAIN_FUNCTIONAL_ASSESSMENT: 0-10
PAIN_FUNCTIONAL_ASSESSMENT: ACTIVITIES ARE NOT PREVENTED
PAIN_FUNCTIONAL_ASSESSMENT: PREVENTS OR INTERFERES SOME ACTIVE ACTIVITIES AND ADLS

## 2020-01-01 ASSESSMENT — LIFESTYLE VARIABLES: SMOKING_STATUS: 1

## 2020-01-01 ASSESSMENT — PAIN DESCRIPTION - ONSET
ONSET: ON-GOING
ONSET: GRADUAL
ONSET: GRADUAL
ONSET: ON-GOING

## 2020-03-10 PROBLEM — R91.8 MASS OF RIGHT LUNG: Status: ACTIVE | Noted: 2020-01-01

## 2020-03-10 NOTE — ED PROVIDER NOTES
EMERGENCY DEPARTMENT ENCOUNTER   ATTENDING ATTESTATION     Pt Name: Nathaniel Tomlinson  MRN: [id number]  [place] 1960  Date of evaluation: 3/10/20   Nathaniel Tomlinson is a 64 y.o. female with CC: Chest Pain (2 weeks)    MDM:   The patient is a 26-year-old female who presented to the emergency department with a 2-week history of chest pain cough and shortness of breath. Patient complains of a 2-week history of pain localized to the right side of her chest she has had a nonproductive cough. She has a history of asthma COPD but not on home oxygen. No recent travel or immobility. She takes no medications. She is not on home oxygen. EG sinus tachycardia no acute ST wave elevations or findings consistent with STEMI. Chest x-ray concerning for right upper lobe collapse and concern for hilar mass therefore CT chest was ordered. Positive for right large pleural effusion with a mass with recommendation for biopsy. Orders placed for thoracentesis. Other labs urinalysis unremarkable. Patient discussed with the internal medicine service who agreed to admit for further evaluation treatment. CRITICAL CARE:       EKG: All EKG's are interpreted by the Emergency Department Physician who either signs or Co-signs this chart in the absence of a cardiologist.      RADIOLOGY:All plain film, CT, MRI, and formal ultrasound images (except ED bedside ultrasound) are read by the radiologist, see reports below, unless otherwise noted in MDM or here. CT CHEST PULMONARY EMBOLISM W CONTRAST   Final Result   Consolidative mass in the right upper lobe anterior medially with multiple   scattered satellite nodules in the adjacent right lung as well as multiple   scattered left-sided pulmonary nodules and this is concerning for neoplastic   process. Tissue sampling is recommended. Large right-sided pleural effusion. Prominent aortopulmonary lymph node. XR CHEST PORTABLE   Preliminary Result   1.  Complete right

## 2020-03-10 NOTE — ED PROVIDER NOTES
[address] [place] [address] ED  EMERGENCY DEPARTMENT ENCOUNTER      Pt Name: eMlita Quintanilla  MRN: [id number]  [place] 1960  Date of evaluation: 3/10/20  CHIEF COMPLAINT       Chief Complaint   Patient presents with    Chest Pain     2 weeks     HISTORY OF PRESENT ILLNESS   Sent to the emergency room via private auto with cough and chest pain. Cough has been present for almost a month. She has had right lateral chest pain for the past 2 weeks which is only present when she lays on that side. 2 days ago she developed left upper chest pain that radiates to the back. The left side chest pain is constant and worse with movement and coughing. She has some shortness of breath but denies dizziness or diaphoresis. She was recently diagnosed with asthma. No history of CAD or clotting disorders for her. Her mother does have history of CAD. She is a smoker. The history is provided by the patient. REVIEW OF SYSTEMS     Review of Systems   Constitutional: Positive for activity change. Negative for fever. HENT: Negative for sinus pressure. Respiratory: Positive for shortness of breath. Cardiovascular: Positive for chest pain. Gastrointestinal: Negative for constipation, diarrhea, nausea and vomiting. Musculoskeletal: Positive for back pain. Neurological: Negative for dizziness and headaches. PASTMEDICAL HISTORY     Past Medical History:   Diagnosis Date    Asthma     PUD (peptic ulcer disease)      SURGICAL HISTORY     History reviewed. No pertinent surgical history.   CURRENT MEDICATIONS       Previous Medications    ALBUTEROL SULFATE HFA (VENTOLIN HFA) 108 (90 BASE) MCG/ACT INHALER    Inhale 2 puffs into the lungs every 6 hours as needed for Wheezing    CHOLECALCIFEROL (VITAMIN D3) 50 MCG (2000 UT) CAPS    Take 1 capsule by mouth daily    FLUTICASONE-SALMETEROL (ADVAIR) 250-50 MCG/DOSE AEPB    Inhale 1 puff into the lungs 2 times daily as needed (sob/wheeze)    MULTIPLE VITAMIN (MULTIVITAMIN) TABLET Take 1 tablet by mouth daily (Tab-A-Robert)    PANTOPRAZOLE (PROTONIX) 40 MG TABLET    Take 40 mg by mouth daily     ALLERGIES     has No Known Allergies. FAMILY HISTORY     has no family status information on file. SOCIAL HISTORY       Social History     Tobacco Use    Smoking status: Current Every Day Smoker     Packs/day: 0.50   Substance Use Topics    Alcohol use: Yes     Alcohol/week: 6.0 standard drinks     Types: 6 Cans of beer per week     Comment: 2- beers a day    Drug use: Yes     Comment: weed occasional     PHYSICAL EXAM     INITIAL VITALS: /82   Pulse 120   Temp 98.6 °F (37 °C) (Oral)   Resp 18   Ht 5' 4\" (1.626 m)   Wt 105 lb (47.6 kg)   SpO2 100%   BMI 18.02 kg/m²    Physical Exam  Constitutional:       Appearance: Normal appearance. HENT:      Right Ear: External ear normal.      Left Ear: External ear normal.      Mouth/Throat:      Mouth: Mucous membranes are moist.      Pharynx: Oropharynx is clear. Eyes:      General:         Right eye: No discharge. Left eye: No discharge. Cardiovascular:      Rate and Rhythm: Regular rhythm. Tachycardia present. Pulmonary:      Effort: Pulmonary effort is normal.      Breath sounds: Normal breath sounds. Abdominal:      Palpations: Abdomen is soft. Tenderness: There is no abdominal tenderness. Neurological:      General: No focal deficit present. Mental Status: She is alert and oriented to person, place, and time. Psychiatric:         Mood and Affect: Mood normal.         Thought Content: Thought content normal.         DIAGNOSTIC RESULTS     RADIOLOGY:All plain film, CT, MRI, and formal ultrasound images (except ED bedside ultrasound) are read by the radiologist, see reports below, unless otherwise noted in MDM or here.     Interpretation per the Radiologist below, if available at the time of this note:    CT CHEST PULMONARY EMBOLISM W CONTRAST   Final Result   Consolidative mass in the right upper lobe anterior medially with multiple   scattered satellite nodules in the adjacent right lung as well as multiple   scattered left-sided pulmonary nodules and this is concerning for neoplastic   process. Tissue sampling is recommended. Large right-sided pleural effusion. Prominent aortopulmonary lymph node. XR CHEST PORTABLE   Preliminary Result   1. Complete right upper lobe collapse. CT of the chest with IV contrast is   recommended to exclude endobronchial mass or obstructive right hilar mass. 2. Left lung is clear. IR GUIDED THORACENTESIS PLEURAL    (Results Pending)       LABS:  Labs Reviewed   CBC WITH AUTO DIFFERENTIAL - Abnormal; Notable for the following components:       Result Value    RBC 3.34 (*)     .1 (*)     MCH 38.0 (*)     All other components within normal limits   BASIC METABOLIC PANEL - Abnormal; Notable for the following components:    Sodium 133 (*)     Chloride 94 (*)     All other components within normal limits   TROP/MYOGLOBIN - Abnormal; Notable for the following components:    Myoglobin 22 (*)     All other components within normal limits   TROP/MYOGLOBIN - Abnormal; Notable for the following components:    Myoglobin 22 (*)     All other components within normal limits   GRAM STAIN   CULTURE, BODY FLUID   CULTURE WITH SMEAR, ACID FAST BACILLIUS   CULTURE, FUNGUS   D-DIMER, QUANTITATIVE   PROTIME-INR   APTT   BODY FLUID CELL COUNT WITH DIFFERENTIAL   PROTEIN, BODY FLUID   CYTOLOGY, NON-GYN   FLOW CYTOMETRY LEUKEMIA/LYMPHOMA NODES OR FLUIDS   LACTATE DEHYDROGENASE, BODY FLUID   GLUCOSE, BODY FLUID   PH, BODY FLUID   SMEAR FUNGUS       All other labs were within normal range or not returned as of this dictation.     EMERGENCY DEPARTMENT COURSE and DIFFERENTIAL DIAGNOSIS/MDM:   Vitals:    Vitals:    03/10/20 1328   BP: 132/82   Pulse: 120   Resp: 18   Temp: 98.6 °F (37 °C)   TempSrc: Oral   SpO2: 100%   Weight: 105 lb (47.6 kg)   Height: 5' 4\" (1.626 m)

## 2020-03-10 NOTE — PROGRESS NOTES
[facility] Pharmacy Service   Medication Review    The patient's list of current home medications has been reviewed. Source(s) of information: patient, surescripts, [facility] ([place] [facility])    Based on information provided by the above source(s), I have updated the patient's home med list as described below. I changed or updated the following medications on the patient's home medication list:  Discontinued None      Added Albuterol Inhaler - 2Q6H PRN wheezing  Vitamin D 2000 - 1QD  Tab A Robert - 1QD  Protonix 40mg - 1QD  Wixela 250/50 - 1BID PRN SOB/wheeze     Adjusted   None      Other Notes Wixela 250/50 - Patient states she uses PRN - last fill 11/2019 for 30 day supply (1BID)           Please feel free to call me with any questions about this encounter. Thank you. This note will be reviewed and co-signed by the [facility] Pharmacist. The pharmacist will review inpatient orders and contact the physician about any discrepancies. Nilton Thomas, pharmacy technician  [facility] Pharmacy Service  Phone:  [phone]  Fax: [phone]      Electronically signed by Nilton Thomas on 3/10/2020 at 4:40 PM     Prior to Admission medications    Medication Sig Start Date End Date Taking?  Authorizing Provider   albuterol sulfate HFA (VENTOLIN HFA) 108 (90 Base) MCG/ACT inhaler Inhale 2 puffs into the lungs every 6 hours as needed for Wheezing       Cholecalciferol (VITAMIN D3) 50 MCG (2000 UT) CAPS Take 1 capsule by mouth daily       Multiple Vitamin (MULTIVITAMIN) tablet Take 1 tablet by mouth daily (Tab-A-Robert)       pantoprazole (PROTONIX) 40 MG tablet Take 40 mg by mouth daily       fluticasone-salmeterol (ADVAIR) 250-50 MCG/DOSE AEPB Inhale 1 puff into the lungs 2 times daily as needed (sob/wheeze)

## 2020-03-11 NOTE — CONSULTS
allergies. Social History     Social History     Tobacco Use    Smoking status: Current Every Day Smoker     Packs/day: 0.50    Smokeless tobacco: Never Used   Substance Use Topics    Alcohol use: Yes     Alcohol/week: 6.0 standard drinks     Types: 6 Cans of beer per week     Comment: 2- beers a day     Family History          Problem Relation Age of Onset    Hypertension Mother     Heart Disease Mother      ROS - 6 systems   General Denies any fever or chills  HEENT Denies any diplopia, tinnitus or vertigo  Resp positive for  cough with sputum, dyspnea and pleuritic pain  Cardiac Denies any chest pain, palpitations, claudication or edema  GI Denies any melena, hematochezia, hematemesis or pyrosis   Denies any frequency, urgency, hesitancy or incontinence  Heme Denies bruising or bleeding easily  Endocrine Denies any history of diabetes or thyroid disease  Neuro Denies any focal motor or sensory deficits  Psychiatric Denies anxiety, depression, suicidal ideation  Skin Denies rashes, itching, open sores  Vitals     height is 5' 4\" (1.626 m) and weight is 118 lb (53.5 kg). Her oral temperature is 98.5 °F (36.9 °C). Her blood pressure is 122/87 and her pulse is 109. Her respiration is 16 and oxygen saturation is 95%. Body mass index is 20.25 kg/m². I/O        Intake/Output Summary (Last 24 hours) at 3/11/2020 1456  Last data filed at 3/11/2020 1130  Gross per 24 hour   Intake --   Output 2000 ml   Net -2000 ml     I/O last 3 completed shifts:  In: -   Out: 1250 [Urine:1250]   Patient Vitals for the past 96 hrs (Last 3 readings):   Weight   03/11/20 0559 118 lb (53.5 kg)   03/10/20 1832 115 lb 11.2 oz (52.5 kg)   03/10/20 1328 105 lb (47.6 kg)     Exam   General Appearance   Awake, alert, oriented, in no acute distress  HEENT - Head is normocephalic, atraumatic.  Pupil reactive to light  Neck - Supple, trachea midline and straight  Lungs -moderate air exchange, decreased right upper lobe  Cardiovascular -

## 2020-03-11 NOTE — PROGRESS NOTES
Spoke with Dr. Nelida Ramos regarding lung biopsy, He does not believe there is a mass but a consolidation in the bronchus. The biopsy will not be done. Patient to have thoracentesis today.

## 2020-03-11 NOTE — H&P
urinating, dysuria, frequency and urgency. Musculoskeletal: Positive for back pain. Negative for gait problem. Skin: Negative for color change and rash. Neurological: Negative for dizziness, weakness, light-headedness, numbness and headaches. Hematological: Does not bruise/bleed easily. Psychiatric/Behavioral: The patient is not nervous/anxious. All other systems reviewed and are negative. Physical Exam:   /72   Pulse 124   Temp 98.2 °F (36.8 °C) (Oral)   Resp 16   Ht 5' 4\" (1.626 m)   Wt 115 lb 11.2 oz (52.5 kg)   SpO2 91%   BMI 19.86 kg/m²   Temp (24hrs), Av.5 °F (36.9 °C), Min:98.2 °F (36.8 °C), Max:98.6 °F (37 °C)    No results for input(s): POCGLU in the last 72 hours. Intake/Output Summary (Last 24 hours) at 3/10/2020 2246  Last data filed at 3/10/2020 2039  Gross per 24 hour   Intake --   Output 800 ml   Net -800 ml       Physical Exam  Vitals signs and nursing note reviewed. Constitutional:       General: She is not in acute distress. Appearance: She is well-developed. She is not diaphoretic. HENT:      Head: Normocephalic and atraumatic. Right Ear: Hearing normal.      Left Ear: Hearing normal.      Nose: Nose normal. No rhinorrhea. Eyes:      General: Lids are normal.      Extraocular Movements:      Right eye: Normal extraocular motion. Left eye: Normal extraocular motion. Conjunctiva/sclera: Conjunctivae normal.      Right eye: Right conjunctiva is not injected. Left eye: Left conjunctiva is not injected. Pupils: Pupils are equal, round, and reactive to light. Pupils are equal.      Right eye: Pupil is reactive. Left eye: Pupil is reactive. Neck:      Musculoskeletal: Neck supple. Thyroid: No thyromegaly. Vascular: No carotid bruit. Trachea: Trachea and phonation normal. No tracheal deviation. Cardiovascular:      Rate and Rhythm: Regular rhythm. Tachycardia present. Pulses: Normal pulses.       Heart recommended to exclude endobronchial mass or obstructive right hilar mass. 2. Left lung is clear. Ct Chest Pulmonary Embolism W Contrast    Result Date: 3/10/2020  Consolidative mass in the right upper lobe anterior medially with multiple scattered satellite nodules in the adjacent right lung as well as multiple scattered left-sided pulmonary nodules and this is concerning for neoplastic process. Tissue sampling is recommended. Large right-sided pleural effusion. Prominent aortopulmonary lymph node. Assessment :      Hospital Problems           Last Modified POA    * (Principal) Mass of right lung 3/11/2020 Yes    Pleural effusion, right 3/11/2020 Yes    Tobacco dependence 3/11/2020 Yes    Chest pain at rest 3/11/2020 Yes          Plan:     Patient status inpatient in the  Med/Surge    1. EKG, CXR and CT chest reviewed. 2. Consult pulmonary, appreciate recommendations. 3. Right lung mass- consider biopsy. 4. Pleural effusion- Consult IR for right thoracentesis. 5. Fluid for cytology and culture. 6. Chest pain- secondary to mass and plural effusion. Doubt cardiac cause. 7. Tobacco dependence- smoking cessation, nicotine patch. 8. Supplemental oxygen as needed. 9. Nebulizer treatments. 10. Pain control. 11. Follow chemistries. 12. DVT prophylaxis. 13. Replete electrolytes as needed. 14. Telemetry. 15. Monitor vitals. 16. NPO after midnight. 17. Activity as tolerated with assist.     Plan of care discussed with patient, Fred Nieves and Irene Zavala. Consultations:   IP CONSULT TO INTERNAL MEDICINE  IP CONSULT TO PULMONOLOGY    Patient is admitted as inpatient status because of co-morbidities listed above, severity of signs and symptoms as outlined, requirement for current medical therapies and most importantly because of direct risk to patient if care not provided in a hospital setting.     FAHAD Puga CNP  3/10/2020  10:46 PM    Copy sent to Dr. Shabbir Dillon MD (Please note that portions of this note were completed with a voice recognition program. Efforts were made to edit the dictations but occasionally words are mis-transcribed.)

## 2020-03-11 NOTE — PLAN OF CARE
Problem: Pain:  Goal: Pain level will decrease  Description: Pain level will decrease  Outcome: Ongoing     Problem: Pain:  Goal: Control of acute pain  Description: Control of acute pain  Outcome: Ongoing     Problem: Pain:  Goal: Control of chronic pain  Description: Control of chronic pain  Outcome: Ongoing     Problem: Falls - Risk of:  Goal: Will remain free from falls  Description: Will remain free from falls  Outcome: Ongoing     Problem: Falls - Risk of:  Goal: Absence of physical injury  Description: Absence of physical injury  Outcome: Ongoing     Problem: DAILY CARE  Goal: Daily care needs are met  Outcome: Ongoing     Problem: KNOWLEDGE DEFICIT  Goal: Patient/S.O. demonstrates understanding of disease process, treatment plan, medications, and discharge instructions.   Outcome: Ongoing     Problem: DISCHARGE BARRIERS  Goal: Patient's continuum of care needs are met  Outcome: Ongoing     Problem: Tobacco Use:  Goal: Inpatient tobacco use cessation counseling participation  Description: Inpatient tobacco use cessation counseling participation  Outcome: Ongoing

## 2020-03-11 NOTE — PLAN OF CARE
Problem: Pain:  Description: Pain management should include both nonpharmacologic and pharmacologic interventions. Goal: Pain level will decrease  Description: Pain level will decrease  Outcome: Ongoing  Goal: Control of acute pain  Description: Control of acute pain  Outcome: Ongoing  Goal: Control of chronic pain  Description: Control of chronic pain  Outcome: Ongoing     Problem: Falls - Risk of:  Goal: Will remain free from falls  Description: Will remain free from falls  Outcome: Ongoing  Goal: Absence of physical injury  Description: Absence of physical injury  Outcome: Ongoing     Problem: DAILY CARE  Goal: Daily care needs are met  Outcome: Ongoing     Problem: KNOWLEDGE DEFICIT  Goal: Patient/S.O. demonstrates understanding of disease process, treatment plan, medications, and discharge instructions.   Outcome: Ongoing     Problem: DISCHARGE BARRIERS  Goal: Patient's continuum of care needs are met  Outcome: Ongoing     Problem: Tobacco Use:  Goal: Inpatient tobacco use cessation counseling participation  Description: Inpatient tobacco use cessation counseling participation  Outcome: Ongoing

## 2020-03-11 NOTE — CARE COORDINATION
Case Management Initial Discharge Plan  Cory Gutierrez,             Met with:patient to discuss discharge plans. Information verified: address, contacts, phone number, , insurance Yes  PCP: Stephanie De Jesus MD  Date of last visit: 19    Insurance Provider: [employer]    Discharge Planning    Living Arrangements:  Alone   Support Systems:  Spouse/Significant Other    Home has 2nd story apt  8 stairs to climb to get into front door, 8stairs to climb to reach second floor  Location of bedroom/bathroom in home  - main floor of apt    Patient able to perform ADL's:Independent    Current Services (outpatient & in home) none  DME equipment: none  DME provider: N/A    Ph[facility]: [facility]    Potential Assistance Needed:  N/A    Patient agreeable to home care: No  [place] of choice provided:  n/a    Prior SNF/Rehab Placement and Facility: No  Agreeable to SNF/Rehab: No  [place] of choice provided: n/a   Evaluation: n/a    Expected Discharge date:     Patient expects to be discharged to:  private residence  Follow Up Appointment: Best Day/ Time:      Transportation provider: friend  Transportation arrangements needed for discharge: No    Readmission Risk              Risk of Unplanned Readmission:        9             Does patient have a readmission risk score greater than 14?: No  If yes, follow-up appointment must be made within 7 days of discharge. Goal of Care:       Discharge Plan: Met with patient at bedside. Lives alone, independent and does not drive. Takes bus and friend provides transportation. Admitted for mass rt lung and has had cough and rt sided chest pain for past month. Had thoracentesis today and 600ml fluid removed. Pulmonary consulted and potential bronchoscopy to be scheduled. No home needs anticipated and cont to follow pulmonary plan of care.               Electronically signed by Leyda Page RN on 3/11/20 at 3:40 PM EDT

## 2020-03-11 NOTE — H&P (VIEW-ONLY)
NOT REPORTED     Seg Neutrophils 62 36 - 65 %    Lymphocytes 26 24 - 43 %    Monocytes 11 3 - 12 %    Eosinophils % 1 1 - 4 %    Basophils 0 0 - 2 %    Immature Granulocytes 0 0 %    Segs Absolute 3.66 1.50 - 8.10 k/uL    Absolute Lymph # 1.53 1.10 - 3.70 k/uL    Absolute Mono # 0.65 0.10 - 1.20 k/uL    Absolute Eos # 0.06 0.00 - 0.44 k/uL    Basophils Absolute 0.00 0.00 - 0.20 k/uL    Absolute Immature Granulocyte 0.00 0.00 - 0.30 k/uL    Morphology MACROCYTOSIS PRESENT    Basic Metabolic Panel    Collection Time: 03/10/20  2:04 PM   Result Value Ref Range    Glucose 95 70 - 99 mg/dL    BUN 10 6 - 20 mg/dL    CREATININE 0.73 0.50 - 0.90 mg/dL    Bun/Cre Ratio 14 9 - 20    Calcium 9.0 8.6 - 10.4 mg/dL    Sodium 133 (L) 135 - 144 mmol/L    Potassium 4.1 3.7 - 5.3 mmol/L    Chloride 94 (L) 98 - 107 mmol/L    CO2 26 20 - 31 mmol/L    Anion Gap 13 9 - 17 mmol/L    GFR Non-African American >60 >60 mL/min    GFR African American >60 >60 mL/min    GFR Comment          GFR Staging NOT REPORTED    TROP/MYOGLOBIN    Collection Time: 03/10/20  2:04 PM   Result Value Ref Range    Troponin, High Sensitivity 7 0 - 14 ng/L    Troponin T NOT REPORTED <0.03 ng/mL    Troponin Interp NOT REPORTED     Myoglobin 22 (L) 25 - 58 ng/mL   D-Dimer, Quantitative    Collection Time: 03/10/20  2:04 PM   Result Value Ref Range    D-Dimer, Quant 3.86 mg/L FEU   Protime-INR    Collection Time: 03/10/20  2:04 PM   Result Value Ref Range    Protime 10.7 9.7 - 11.6 sec    INR 1.0    APTT    Collection Time: 03/10/20  2:04 PM   Result Value Ref Range    PTT 30.8 23 - 31 sec   TROP/MYOGLOBIN    Collection Time: 03/10/20  4:02 PM   Result Value Ref Range    Troponin, High Sensitivity 10 0 - 14 ng/L    Troponin T NOT REPORTED <0.03 ng/mL    Troponin Interp NOT REPORTED     Myoglobin 22 (L) 25 - 58 ng/mL       Imaging/Diagnostics:  Xr Chest Portable    Result Date: 3/10/2020  1. Complete right upper lobe collapse.   CT of the chest with IV contrast is (Please note that portions of this note were completed with a voice recognition program. Efforts were made to edit the dictations but occasionally words are mis-transcribed.)

## 2020-03-11 NOTE — PROGRESS NOTES
[facility]    Progress Note    3/11/2020    11:43 AM    Name:   Flora Guzman  MRN:     [id number]     Acct:      [de-identified]   Room:   2022/2022-01  IP Day:  1  Admit Date:  3/10/2020  1:34 PM    PCP:   Sera Bronson MD  Code Status:  Full Code    Subjective:     C/C:   Chief Complaint   Patient presents with    Chest Pain     2 weeks     Interval History Status:  Was complaining of pain in left upper part of chest also but states she fell down 2 days back and probably had hurt there  Patient still had right-sided chest pain with shortness of breath, will schedule to go for right-sided thoracentesis, IR wanted to put in order for lung biopsy also, it was done. Thoracentesis has been thank you and 600 mL of fluid has been removed, lung biopsy not done, per IR it appears to be postobstructive atelectasis from obstruction RUL bronchus and are suggesting bronchoscopy, pulmonary is already consulted  She is active smoker, having a nicotine patch now  Brief History:   Flora Guzman is a 64 y.o. Non-/non  female who presents with Chest Pain (2 weeks)   and is admitted to the hospital for the management of Mass of right lung.     Patient states that she has been experiencing shortness of breath and chest pain that is greater on her right side with radiation to her upper back for approximately 2-3 weeks. She describes her pain as aching and 6/10 in intensity. She states her pain is greatly aggravated by laying on her right side. She endorses a nonproductive cough. She denies further symptomology or additional definitive modifying factors. She has past medical history that includes asthma and is a current every day smoker.     D-dimer 3.86, sensitivity troponin 7 & 10. EKG indicates sinus tachycardia with possible left atrial enlargement. No overt ST depression or elevation.   Ventricular rate of 117.     CT chest with contrast Relation Age of Onset    Hypertension Mother     Heart Disease Mother        Vitals:  /69   Pulse 138   Temp 97.9 °F (36.6 °C) (Oral)   Resp 18   Ht 5' 4\" (1.626 m)   Wt 118 lb (53.5 kg)   SpO2 98%   BMI 20.25 kg/m²   Temp (24hrs), Av.3 °F (36.8 °C), Min:97.9 °F (36.6 °C), Max:98.6 °F (37 °C)    No results for input(s): POCGLU in the last 72 hours. I/O (24Hr): Intake/Output Summary (Last 24 hours) at 3/11/2020 1143  Last data filed at 3/11/2020 1130  Gross per 24 hour   Intake --   Output 2000 ml   Net -2000 ml       Labs:  Hematology:  Recent Labs     03/10/20  1404 20  0648   WBC 5.9 4.7   RBC 3.34* 3.21*   HGB 12.7 11.9   HCT 38.1 36.0*   .1* 112.1*   MCH 38.0* 37.1*   MCHC 33.3 33.1   RDW 13.0 12.9    112*   MPV 10.3 10.1   INR 1.0  --    DDIMER 3.86  --      Chemistry:  Recent Labs     03/10/20  1404 03/10/20  1602 20  0648   *  --   --    K 4.1  --   --    CL 94*  --   --    CO2 26  --   --    GLUCOSE 95  --   --    BUN 10  --   --    CREATININE 0.73  --   --    MG  --   --  1.9   ANIONGAP 13  --   --    LABGLOM >60  --   --    GFRAA >60  --   --    CALCIUM 9.0  --   --    PROBNP  --   --  98   TROPHS 7 10  --    MYOGLOBIN 22* 22*  --      Recent Labs     20  0648   CHOL 152   HDL 47   LDLCHOLESTEROL 85   CHOLHDLRATIO 3.2   TRIG 98   VLDL NOT REPORTED     ABG:No results found for: POCPH, PHART, PH, POCPCO2, LRE3WQH, PCO2, POCPO2, PO2ART, PO2, POCHCO3, ORL1DUQ, HCO3, NBEA, PBEA, BEART, BE, THGBART, THB, OYY1ZGW, MIMM6XOY, P7EPPOTE, O2SAT, FIO2  No results found for: SPECIAL  No results found for: CULTURE    Radiology:  Xr Chest Portable    Result Date: 3/10/2020  1. Complete right upper lobe collapse. CT of the chest with IV contrast is recommended to exclude endobronchial mass or obstructive right hilar mass. 2. Left lung is clear.      Ct Chest Pulmonary Embolism W Contrast    Result Date: 3/10/2020  Consolidative mass in the right upper lobe

## 2020-03-11 NOTE — PROGRESS NOTES
The patient returns to the room from IR for thoracentesis; no distress. Dressing to right mid back noted, CDI.

## 2020-03-12 NOTE — PROGRESS NOTES
[facility]    Progress Note    3/12/2020    3:41 PM    Name:   Ruby Angelo  MRN:     [id number]     [place]:      [de-identified]   Room:   2022/2022-01   Day:  2  Admit Date:  3/10/2020  1:34 PM    PCP:   Chloe Rodriguez MD  Code Status:  Full Code    Subjective:     C/C:   Chief Complaint   Patient presents with    Chest Pain     2 weeks     Interval History Status:  Patient says it hurts in her chest only when she coughs or moves a certain way, otherwise there is no chest pain, currently does not have any chest pain, denies any exertional chest pain    Brief History:   Ruby Angelo is a 64 y.o. Non-/non  female who presents with Chest Pain (2 weeks)   and is admitted to the hospital for the management of Mass of right lung.     Patient states that she has been experiencing shortness of breath and chest pain that is greater on her right side with radiation to her upper back for approximately 2-3 weeks. She describes her pain as aching and 6/10 in intensity. She states her pain is greatly aggravated by laying on her right side. She endorses a nonproductive cough. She denies further symptomology or additional definitive modifying factors. She has past medical history that includes asthma and is a current every day smoker.     D-dimer 3.86, sensitivity troponin 7 & 10. EKG indicates sinus tachycardia with possible left atrial enlargement. No overt ST depression or elevation. Ventricular rate of 117.     CT chest with contrast indicates consolidative mass in the right upper lobe anterior medially with multiple scattered satellite nodules in the adjacent right lung as well as multiple scattered left-sided pulmonary nodules, this is concerning for neoplastic process. Tissue sampling is recommended. Large right-sided pleural effusion. Prominent aortopulmonary lymph node.     Patient underwent IR guided thoracentesis and 600 mL of fluid was removed. Biopsy could not be done and is scheduled for [facility]  Review of Systems:     Constitutional:  negative for chills, fevers, sweats  Respiratory:  negative for cough, +dyspnea on exertion,+ right-sided chest pain aggravated on lying on right side  Cardiovascular:  negative for chest pain, chest pressure/discomfort, lower extremity edema, palpitations  Gastrointestinal:  negative for abdominal pain, constipation, diarrhea, nausea, vomiting  Neurological:  negative for dizziness, headache    Medications: Allergies:  No Known Allergies    Current Meds:   Scheduled Meds:    levalbuterol  1.25 mg Nebulization Q6H    budesonide-formoterol  2 puff Inhalation BID    pantoprazole  40 mg Oral Daily    sodium chloride flush  10 mL Intravenous 2 times per day    [Held by provider] enoxaparin  40 mg Subcutaneous Daily     Continuous Infusions:   PRN Meds: sodium chloride nebulizer, albuterol, sodium chloride flush, acetaminophen **OR** acetaminophen, polyethylene glycol, promethazine **OR** ondansetron, nicotine, potassium chloride **OR** potassium alternative oral replacement **OR** potassium chloride, magnesium sulfate, HYDROcodone 5 mg - acetaminophen **OR** HYDROcodone 5 mg - acetaminophen, morphine    Data:     Past Medical History:   has a past medical history of Asthma and PUD (peptic ulcer disease). Social History:   reports that she has been smoking. She has been smoking about 0.50 packs per day. She has never used smokeless tobacco. She reports current alcohol use of about 6.0 standard drinks of alcohol per week. She reports current drug use.      Family History:   Family History   Problem Relation Age of Onset    Hypertension Mother     Heart Disease Mother        Vitals:  /76   Pulse 111   Temp 98.2 °F (36.8 °C) (Oral)   Resp 16   Ht 5' 4\" (1.626 m)   Wt 112 lb 8 oz (51 kg)   SpO2 93%   BMI 19.31 kg/m²   Temp (24hrs), Av.3 °F (36.8 °C), Min:97.9 °F (36.6 °C), Max:99 °F

## 2020-03-12 NOTE — PROGRESS NOTES
signed by     FAHAD Rosenthal CNP on 3/12/2020 at 11:11 AM  Patient was seen under the supervision of Dr. Juanita Moss and Sleep Medicine,    [facility] AT [place]: [phone]

## 2020-03-13 NOTE — OP NOTE
survey was significant for   · No mass  · No bleeding  · No significant inflammation  · No significant bronchomalcia  · No mucous plugging    Estimated Blood Loss: None   Complications  None    Specimens Taken:  Washings - Location -bronchus intermedius  Brushing in right upper lobe and right middle lobe area    Electronically signed by     Philip Baumann MD, [facility]  Pulmonary Critical Care and Sleep Medicine,  [facility]  Cell: [phone]  Office: [phone]    3/13/2020 at 1:50 PM

## 2020-03-13 NOTE — PROGRESS NOTES
[facility]    Progress Note    3/13/2020    12:55 PM    Name:   Ruby Angelo  MRN:     [id number]     Marco:      [de-identified]   Room:   2022/2022-01  IP Day:  3  Admit Date:  3/10/2020  1:34 PM    PCP:   Chloe Rodriguez MD  Code Status:  Full Code    Subjective:     C/C:   Chief Complaint   Patient presents with    Chest Pain     2 weeks     Interval History Status: improved. Overall feels better today  Denies n/v  Some cp-reproducible  For bronch today    Brief History:     Per my partner:  \"Michelle Tesfaye is a 64 y.o. Non-/non  female who presents with Chest Pain (2 weeks)   and is admitted to the hospital for the management of Mass of right lung.     Patient states that she has been experiencing shortness of breath and chest pain that is greater on her right side with radiation to her upper back for approximately 2-3 weeks.  She describes her pain as aching and 6/10 in intensity.  She states her pain is greatly aggravated by laying on her right side. She endorses a nonproductive cough. She denies further symptomology or additional definitive modifying factors.  She has past medical history that includes asthma and is a current every day smoker.     D-dimer 3.86, sensitivity troponin 7 & 10.   EKG indicates sinus tachycardia with possible left atrial enlargement.  No overt ST depression or elevation.  Ventricular rate of 117.     CT chest with contrast indicates consolidative mass in the right upper lobe anterior medially with multiple scattered satellite nodules in the adjacent right lung as well as multiple scattered left-sided pulmonary nodules, this is concerning for neoplastic process.  Tissue sampling is recommended.  Large right-sided pleural effusion.  Prominent aortopulmonary lymph node.     Patient underwent IR guided thoracentesis and 600 mL of fluid was removed.   Biopsy could not be done and is scheduled for [facility]:  Hematology:  Recent Labs     03/10/20  1404 03/11/20  0648 03/12/20  1147 03/13/20  0523   WBC 5.9 4.7  --  3.9   RBC 3.34* 3.21*  --  3.11*   HGB 12.7 11.9  --  11.7*   HCT 38.1 36.0*  --  34.6*   .1* 112.1*  --  111.3*   MCH 38.0* 37.1*  --  37.6*   MCHC 33.3 33.1  --  33.8   RDW 13.0 12.9  --  12.8    112*  --  103*   MPV 10.3 10.1  --  10.1   INR 1.0  --  1.1  --    DDIMER 3.86  --   --   --      Chemistry:  Recent Labs     03/10/20  1404 03/10/20  1602 03/11/20  0648 03/13/20  0523   *  --   --  136   K 4.1  --   --  4.1   CL 94*  --   --  98   CO2 26  --   --  28   GLUCOSE 95  --   --  113*   BUN 10  --   --  6   CREATININE 0.73  --   --  0.68   MG  --   --  1.9  --    ANIONGAP 13  --   --  10   LABGLOM >60  --   --  >60   GFRAA >60  --   --  >60   CALCIUM 9.0  --   --  8.5*   PROBNP  --   --  98 69   TROPHS 7 10  --   --    MYOGLOBIN 22* 22*  --   --      Recent Labs     03/11/20  0648   CHOL 152   HDL 47   LDLCHOLESTEROL 85   CHOLHDLRATIO 3.2   TRIG 98   VLDL NOT REPORTED     ABG:No results found for: POCPH, PHART, PH, POCPCO2, LRU1XLY, PCO2, POCPO2, PO2ART, PO2, POCHCO3, BVF5XBU, HCO3, NBEA, PBEA, BEART, BE, THGBART, THB, KJU3UNA, KYJO8HZQ, S8HTATLI, O2SAT, FIO2  Lab Results   Component Value Date/Time    SPECIAL NOT REPORTED 03/11/2020 12:15 PM    SPECIAL NOT REPORTED 03/11/2020 12:15 PM    SPECIAL NOT REPORTED 03/11/2020 12:15 PM     Lab Results   Component Value Date/Time    CULTURE NO GROWTH 2 DAYS 03/11/2020 12:15 PM    CULTURE PENDING 03/11/2020 12:15 PM       Radiology:  Xr Chest Portable    Result Date: 3/10/2020  1. Complete right upper lobe collapse. CT of the chest with IV contrast is recommended to exclude endobronchial mass or obstructive right hilar mass. 2. Left lung is clear. Xr Chest 1 Vw    Result Date: 3/11/2020  1. Status post right thoracentesis with no immediate complications.  2. No significant change right upper lobe consolidation with bowing of the fissure. This is worrisome for postobstructive atelectasis from an endobronchial mass. Ct Chest Pulmonary Embolism W Contrast    Result Date: 3/10/2020  Consolidative mass in the right upper lobe anterior medially with multiple scattered satellite nodules in the adjacent right lung as well as multiple scattered left-sided pulmonary nodules and this is concerning for neoplastic process. Tissue sampling is recommended. Large right-sided pleural effusion. Prominent aortopulmonary lymph node. Us Thoracentesis    Result Date: 3/11/2020  1. Successful ultrasound guided right thoracentesis. Physical Examination:        General appearance:  alert, cooperative and no distress  Mental Status:  oriented to person, place and time and normal affect  Lungs:  clear bilaterally, normal effort; poor airflow right upper long  Heart:  regular rate and rhythm, no murmur  Abdomen:  soft, nontender, nondistended, normal bowel sounds, no masses, hepatomegaly, splenomegaly  Extremities:  no edema, redness, tenderness in the calves  Skin:  no gross lesions, rashes, induration    Assessment:        Hospital Problems           Last Modified POA    * (Principal) Mass of right lung 3/11/2020 Yes    Pleural effusion, right 3/11/2020 Yes    Tobacco dependence 3/11/2020 Yes    Chest pain at rest 3/11/2020 Yes       -musculoskeletal cp    Plan:        1.  Dc home after bronch today    eCleste Saunders, DO  3/13/2020  12:55 PM

## 2020-03-13 NOTE — PROGRESS NOTES
IR OP scheduling called to schedule OP CT guided RUL bx. Message left. Await return call.  Central schedule unable to schedule

## 2020-03-13 NOTE — DISCHARGE SUMMARY
[facility]    Discharge Summary     Patient ID: Damir Mi  :  1960   MRN: [id number]     ACCOUNT:  [de-identified]   Patient's PCP: Adeline Cardozo MD  Admit Date: 3/10/2020   Discharge Date: 3/13/2020     Length of Stay: 3  Code Status:  Full Code  Admitting Physician: Clayton Sheikh MD  Discharge Physician: Ruth Saunders DO     Active Discharge Diagnoses:     Hospital Problem Lists:  Principal Problem: Mass of right lung  Active Problems:    Pleural effusion, right    Tobacco dependence    Chest pain at rest  Resolved Problems:    * No resolved hospital problems. *      Admission Condition:  fair     Discharged Condition: stable    Hospital Stay:     Hospital Course:  Damir Mi is a 64 y.o. female who was admitted for the management of  Mass of right lung , presented to ER with Chest Pain (2 weeks)    Admitted with sob and cough. Lung mas found on cxr, confirmed by ct chest.  Had also fallen and had left sided cp from that. cxr also showed right pleural effusion, had thoracentesis on 3/11: 600cc removed. Bronch on 3/13 showed: Endobronchial findings  Distal trachea: relatively normal with no significant lesion. CarinaMyrle Mervat and mobile with no mass. Right endobronchial trees: The endobronchial survey was significant for   · No bleeding  · No significant inflammation  · No significant bronchomalcia  · No mucous plugging  · RUL bronchus was completely closed with extrinsic compression and RML was partially obstructed with external compression. Brushing in both areas and washings were obtained. There was no endobronchial mass to be biopsied.     Left endobronchial trees:  The endobronchial survey was significant for   · No mass  · No bleeding  · No significant inflammation  · No significant bronchomalcia  No mucous       Significant therapeutic interventions: see above    Significant Diagnostic Studies:   Labs / Micro:  CBC:   Lab Results   Component Value Date    WBC 3.9 03/13/2020    RBC 3.11 03/13/2020    HGB 11.7 03/13/2020    HCT 34.6 03/13/2020    .3 03/13/2020    MCH 37.6 03/13/2020    MCHC 33.8 03/13/2020    RDW 12.8 03/13/2020     03/13/2020     CMP:    Lab Results   Component Value Date    GLUCOSE 113 03/13/2020     03/13/2020    K 4.1 03/13/2020    CL 98 03/13/2020    CO2 28 03/13/2020    BUN 6 03/13/2020    CREATININE 0.68 03/13/2020    ANIONGAP 10 03/13/2020    LABGLOM >60 03/13/2020    GFRAA >60 03/13/2020    GFR      03/13/2020    GFR NOT REPORTED 03/13/2020    CALCIUM 8.5 03/13/2020        Radiology:  Xr Chest Portable    Result Date: 3/10/2020  1. Complete right upper lobe collapse. CT of the chest with IV contrast is recommended to exclude endobronchial mass or obstructive right hilar mass. 2. Left lung is clear. Xr Chest 1 Vw    Result Date: 3/11/2020  1. Status post right thoracentesis with no immediate complications. 2. No significant change right upper lobe consolidation with bowing of the fissure. This is worrisome for postobstructive atelectasis from an endobronchial mass. Ct Chest Pulmonary Embolism W Contrast    Result Date: 3/10/2020  Consolidative mass in the right upper lobe anterior medially with multiple scattered satellite nodules in the adjacent right lung as well as multiple scattered left-sided pulmonary nodules and this is concerning for neoplastic process. Tissue sampling is recommended. Large right-sided pleural effusion. Prominent aortopulmonary lymph node. Us Thoracentesis    Result Date: 3/11/2020  1. Successful ultrasound guided right thoracentesis.        Consultations:    Consults:     Final Specialist Recommendations/Findings:   IP CONSULT TO INTERNAL MEDICINE  IP CONSULT TO PULMONOLOGY      The patient was seen and examined on day of discharge and this discharge summary is in conjunction with any daily progress note from day of

## 2020-03-13 NOTE — ANESTHESIA POSTPROCEDURE EVALUATION
Department of Anesthesiology  Postprocedure Note    Patient: Lakeisha Barker  MRN: [id number]  YOB: 1960  Date of evaluation: 3/13/2020  Time:  2:59 PM     Procedure Summary     Date:  03/13/20 Room / Location:  [place] [address] / [facility] - INPATIENT    Anesthesia Start:  1319 Anesthesia Stop:  1355    Procedure:  BRONCHOSCOPY BRUSHINGS AND WASHING (N/A ) Diagnosis:  (mass right lung)    Surgeon:  Quinn Rene MD Responsible Provider:  Dominick Florez MD    Anesthesia Type:  general ASA Status:  3          Anesthesia Type: general    Sarah Phase I: Sarah Score: 9    Sarah Phase II:      Last vitals: Reviewed and per EMR flowsheets.        Anesthesia Post Evaluation    Patient location during evaluation: PACU  Patient participation: complete - patient participated  Level of consciousness: awake  Airway patency: patent  Nausea & Vomiting: no nausea  Complications: no  Cardiovascular status: blood pressure returned to baseline  Respiratory status: acceptable  Hydration status: euvolemic

## 2020-03-13 NOTE — PLAN OF CARE
Problem: Pain:  Goal: Pain level will decrease  Description: Pain level will decrease  Note: Pt c/o left side discomfort near chest/shoulder and under arm. Orders for Norco Q4.       Problem: DAILY CARE  Goal: Daily care needs are met  3/13/2020 0355 by Natalie Shane RN  Outcome: Ongoing    Problem: Falls - Risk of:  Goal: Will remain free from falls  Description: Will remain free from falls  Outcome: Ongoing

## 2020-03-13 NOTE — PROGRESS NOTES
Pulmonary Critical Care Progress Note  Hany Stanton M.D. Patient seen for the follow up of Mass of right lung     Subjective:  She is resting in bed in no distress. She does complain of upper chest pain, unchanged. She has occasional cough. Shortness of breath is unchanged. She denies any hemoptysis. Examination:  Vitals: /72   Pulse 108   Temp 98.6 °F (37 °C) (Oral)   Resp 16   Ht 5' 4\" (1.626 m)   Wt 113 lb 8 oz (51.5 kg)   SpO2 93%   BMI 19.48 kg/m²     General appearance: alert and cooperative with exam, nursing at bedside  Neck: No JVD  Lungs: With no significant rhonchi  Heart: regular rate and rhythm, S1, S2 normal, no gallop  Abdomen: Soft, non tender, + BS  Extremities: no cyanosis or clubbing. No significant edema    LABs:  CBC:   Recent Labs     03/11/20  0648 03/13/20  0523   WBC 4.7 3.9   HGB 11.9 11.7*   HCT 36.0* 34.6*   * 103*     BMP:   Recent Labs     03/10/20  1404 03/13/20  0523   * 136   K 4.1 4.1   CO2 26 28   BUN 10 6   CREATININE 0.73 0.68   LABGLOM >60 >60   GLUCOSE 95 113*     PT/INR:   Recent Labs     03/10/20  1404 03/12/20  1147   PROTIME 10.7 11.0   INR 1.0 1.1     APTT:  Recent Labs     03/10/20  1404 03/12/20  1147   APTT 30.8 31.4*     Impression:  · Right upper lobe consolidative mass, 6.6 X 6.1 cm  · Suspect postobstructive pneumonia  · Multiple bilateral pulmonary nodules, measuring up to 8 mm  · Right pleural effusion, s/p right thoracentesis - 600 mL's on 3/11/2020, negative for malignancy  · Current smoker ~ 20 pack-year history  · Asthma   · Chest pain    Recommendations:  · Bronchoscopy today.   Informed consent was obtained from patient  · 2 liters/min via nasal cannula PRN  · Xopenex Q 6 hours and prn  · Symbicort 160-4.5  · Pain control  · Smoking cessation education/Nicotine patch  · Labs: CBC and BMP in am  · Incentive spirometry every hour while awake  · DVT prophylaxis with low molecular weight heparin  · Discussed with

## 2020-03-13 NOTE — DISCHARGE INSTR - ACTIVITY
Call and make an appointment with [facility] interventional radiology [phone] for a CT guided biopsy of right lung mass

## 2020-03-13 NOTE — ANESTHESIA PRE PROCEDURE
% injection 10 mL  10 mL Intravenous PRN Lorelle Katrina, APRN - NP   10 mL at 03/10/20 1949    acetaminophen (TYLENOL) tablet 650 mg  650 mg Oral Q6H PRN Lorelle Katrina, APRN - NP        Or   [facility] [place] acetaminophen (TYLENOL) suppository 650 mg  650 mg Rectal Q6H PRN Lorelle Katrina, APRN - NP        polyethylene glycol (GLYCOLAX) packet 17 g  17 g Oral Daily PRN Lorelle Katrina, APRN - NP        promethazine (PHENERGAN) tablet 12.5 mg  12.5 mg Oral Q6H PRN Lorelle Katrina, APRN - NP        Or    ondansetron [facility]) injection 4 mg  4 mg Intravenous Q6H PRN Lorelle Katrina, APRN - NP        nicotine (NICODERM CQ) 21 MG/24HR 1 patch  1 patch Transdermal Daily PRN Lorelle Katrina, APRN - NP   1 patch at 03/11/20 1953    [Held by provider] enoxaparin (LOVENOX) injection 40 mg  40 mg Subcutaneous Daily Lorelle Katrina, APRN - NP   Stopped at 03/12/20 1150    potassium chloride (KLOR-CON M) extended release tablet 40 mEq  40 mEq Oral PRN Lorelle Katrina, APRN - NP        Or    potassium bicarb-citric acid (EFFER-K) effervescent tablet 40 mEq  40 mEq Oral PRN Lorelle Katrina, APRN - NP        Or   [facility] [place] potassium chloride 10 mEq/100 mL IVPB (Peripheral Line)  10 mEq Intravenous PRN Lorelle Katrina, APRN - NP        magnesium sulfate 2 g in dextrose 5 % 100 mL IVPB  2 g Intravenous PRN Lorelle Katrina, APRN - NP        HYDROcodone-acetaminophen (NORCO) 5-325 MG per tablet 1 tablet  1 tablet Oral Q4H PRN Terrial Canary, APRN - CNP   1 tablet at 03/11/20 0251    Or    HYDROcodone-acetaminophen (NORCO) 5-325 MG per tablet 2 tablet  2 tablet Oral Q4H PRN Terrial Canary, APRN - CNP   2 tablet at 03/12/20 2144    morphine (PF) injection 2 mg  2 mg Intravenous Q4H PRN Terrial Canary, APRN - CNP   2 mg at 03/13/20 4647       Allergies:     Allergies   Allergen Reactions    Ibuprofen Hives       Problem List:    Patient Active Problem List   Diagnosis Code    Mass of right lung R91.8    Pleural effusion, right J90    Tobacco dependence F17.200    Chest pain at rest R07.9       Past Medical History:        Diagnosis Date    Asthma     PUD (peptic ulcer disease)        Past Surgical History:        Procedure Laterality Date    DILATION AND CURETTAGE OF UTERUS         Social History:    Social History     Tobacco Use    Smoking status: Current Every Day Smoker     Packs/day: 0.50    Smokeless tobacco: Never Used   Substance Use Topics    Alcohol use: Yes     Alcohol/week: 6.0 standard drinks     Types: 6 Cans of beer per week     Comment: 2- beers a day                                Ready to quit: Not Answered  Counseling given: Not Answered      Vital Signs (Current):   Vitals:    03/13/20 0029 03/13/20 0437 03/13/20 0710 03/13/20 0920   BP: 105/67  109/72    Pulse: 110  108    Resp: 20  16    Temp: 98.2 °F (36.8 °C)  98.6 °F (37 °C)    TempSrc: Oral  Oral    SpO2: 94%  95% 93%   Weight:  113 lb 8 oz (51.5 kg)     Height:                                                  BP Readings from Last 3 Encounters:   03/13/20 109/72       NPO Status:                                                                                 BMI:   Wt Readings from Last 3 Encounters:   03/13/20 113 lb 8 oz (51.5 kg)     Body mass index is 19.48 kg/m². CBC:   Lab Results   Component Value Date    WBC 3.9 03/13/2020    RBC 3.11 03/13/2020    HGB 11.7 03/13/2020    HCT 34.6 03/13/2020    .3 03/13/2020    RDW 12.8 03/13/2020     03/13/2020       CMP:   Lab Results   Component Value Date     03/13/2020    K 4.1 03/13/2020    CL 98 03/13/2020    CO2 28 03/13/2020    BUN 6 03/13/2020    CREATININE 0.68 03/13/2020    GFRAA >60 03/13/2020    LABGLOM >60 03/13/2020    GLUCOSE 113 03/13/2020    CALCIUM 8.5 03/13/2020       POC Tests: No results for input(s): POCGLU, POCNA, POCK, POCCL, POCBUN, POCHEMO, POCHCT in the last 72 hours.     Coags:   Lab Results   Component Value Date    PROTIME 11.0 03/12/2020    INR 1.1 03/12/2020    APTT 31.4 03/12/2020       HCG (If Applicable): No results found for: PREGTESTUR, PREGSERUM, HCG, HCGQUANT     ABGs: No results found for: PHART, PO2ART, OPP7TIM, IXZ9SFU, BEART, N9BCHOBK     Type & Screen (If Applicable):  No results found for: LABABO, LABRH    Anesthesia Evaluation   no history of anesthetic complications:   Airway: Mallampati: III  TM distance: <3 FB   Neck ROM: full  Mouth opening: < 3 FB Dental:          Pulmonary:   (+) COPD:  rhonchi,  asthma: current smoker (cigarrete and marijuana)                           Cardiovascular:        (-)  angina       Beta Blocker:  Not on Beta Blocker         Neuro/Psych:   Negative Neuro/Psych ROS               ROS comment: Neck pain   GI/Hepatic/Renal:   (+) GERD:, PUD,           Endo/Other: Negative Endo/Other ROS                    Abdominal:           Vascular:                                        Anesthesia Plan      general     ASA 3     (Ett)  Induction: intravenous. MIPS: Postoperative opioids intended and Prophylactic antiemetics administered. Anesthetic plan and risks discussed with patient. Plan discussed with CRNA.     Attending anesthesiologist reviewed and agrees with Cayden Carrizales MD   3/13/2020

## 2020-03-13 NOTE — PROGRESS NOTES
CLINICAL PHARMACY NOTE: [address] Patient?: No  Total # of Prescriptions Filled: 2   The following medications were delivered to the patient:  · NICOTINE STEP 1 PATCHES  · TRAMADOL 50MG  Total # of Interventions Completed: 0  Time Spent (min): 30    Additional Documentation:  ALBUTEROL INHALER WAS TOO SOON TO FILL

## 2020-03-13 NOTE — ANESTHESIA POSTPROCEDURE EVALUATION
Department of Anesthesiology  Postprocedure Note    Patient: Blayne Martinez  MRN: [id number]  YOB: 1960  Date of evaluation: 3/13/2020  Time:  1:48 PM     Procedure Summary     Date:  03/13/20 Room / Location:  [place] [address]    Anesthesia Start:  1319 Anesthesia Stop:      Procedure:  BRONCHOSCOPY BRUSHINGS AND WASHING (N/A ) Diagnosis:  (mass right lung)    Surgeon:  Ele Dave MD Responsible Provider:      Anesthesia Type:  general ASA Status:  3          Anesthesia Type: general    Sarah Phase I:      Sarah Phase II:      Last vitals: Reviewed and per EMR flowsheets.        Anesthesia Post Evaluation    Patient location during evaluation: PACU  Patient participation: complete - patient participated  Level of consciousness: awake  Airway patency: patent  Nausea & Vomiting: no nausea  Complications: no  Cardiovascular status: blood pressure returned to baseline  Respiratory status: acceptable  Hydration status: euvolemic

## 2020-03-13 NOTE — PROGRESS NOTES
Have been unable to schedule CT guided biopsy for patient despite multiple call attempts. Dr. John Alonso notified and informed patient has an appointment to see him in office on 3/19/2020 at 2:45 pm. Dr. John Alonso gave okay for patient to be discharged, patient informed.

## 2020-03-13 NOTE — PROGRESS NOTES
Patient c/o sob, sating 100% on 2L NC, tachycardic in the 110s-120s, Dr Janeen Sweet notified, order received for duoneb treatment.

## 2020-04-09 NOTE — INTERVAL H&P NOTE
cough without wheezes  bilaterally   Abdomen: soft, nontender, nondistended with bowel sounds .        Labs:  Recent Labs     04/09/20  0936 03/13/20  0523   HGB  --  11.7*   HCT  --  34.6*   WBC  --  3.9   MCV  --  111.3*   PLT  --  103*   NA  --  136   K  --  4.1   CL  --  98   CO2  --  28   BUN  --  6   CREATININE  --  0.68   GLUCOSE  --  113*   INR 1.1  --    PROTIME 11.0  --    APTT 31.0  --        Jeferson Lobato   APRN, ANP-BC  Electronically signed 4/9/2020 at 10:04 AM

## 2020-04-13 NOTE — TELEPHONE ENCOUNTER
Name: Goldy Owen  : 1960  MRN: [id number]    Oncology Navigation Follow-Up Note    Contact Type:  Telephone    Subjective:     Objective:     Assistance Needed:     Receptive to Advanced Care Planning / Palliative Care:      Referrals:     Education:     Notes: Navigator reviewing chart and recent biopsy results, plan to follow as referred.      Electronically signed by Jenniefr Reinoso RN on 2020 at 10:37 AM

## 2020-08-10 NOTE — TELEPHONE ENCOUNTER
Tawana from front office into triage as received call from [facility] for Aneesh to receive transfusion however two units ordered. Hgb= 6.3  Writer spoke with Aure Stroud at [facility] and did receive clarification pt has sob and fatigue. Proceed with orders for two untis of rbc.

## 2020-08-12 NOTE — PROGRESS NOTES
Pt arrives per ambulatory per self and pt very anxious and crying and needs reassurance and writer also explaining what to expect, pt signed blood transfusion consent. Discussed premedication and pt states was told by a md not to take tylenol and pt states prefers not to take the tylenol today and so tylenol held. Pt states did have aleve today and also takes half a hydrocodone for occasional right shoulder pain. Writer discussed possible side effects of transfusion and signs of allergic reaction, apprehension, tingling to lips or itching. Pt states understanding. Pt states has a port and per order sent from [facility] to use port for transfusion and pt is agreeable. Evonne Pill blood tube sent to lab per request Josiane Lopez from lab. Pt tolerated po benadryl 25 mg prior well and pt states would like emla cream if available to port, states due to forgot her cream today. Emla cream applied to port for 20 min prior to access. NS infusing before and after first unit, pt notified if BP in range will receive lasix between units to prevent fluid overload, explained why this is done for pt. Pt states understanding. First unit infusing well and no complaints of problems, did state her ear itched briefly, but no other complaints or reactions and blood return present throughout infusion. See flowsheet for all vital signs. Pt also tolerated 2nd unit of PRBC's well and no complaints. No further appts required. Pt discharged per ambulatory per self and then came back to treatment area to ask for papers regarding blood transfusion, states still feels like she \"is breathing hard\" after going downstairs and then coming back up and explained again to pt if her shortness of breath has been from low HGB, and not her disease, then her shortness of breath should improve. Pt notified should call her doctor if she has any problems at home post transfusion.    Pt given [facility] information on blood transfusions as a print out and also given her AVS from treatment today per [facility] to pt in waiting area, due to pt anxious to go , due to ride coming to pick her up.

## 2020-10-30 NOTE — H&P
History and Physical Update    Pt Name: Katie Parish  MRN: [id number]  YOB: 1960  Date of evaluation: 10/30/2020      [x] I have reviewed the Oncology Progress Note by Dr Nasima Lyon dated 10/26/20 labeled in short chart which meets the criteria for an Interval History and Physical note. [x] I have examined  Katie Parish  There are no changes to the patient who is scheduled for a thoracentesis in IR by Dr Nicci Flores for malignant neoplasm right upper lobe, right bronchus or lung   The patient denies new health changes, fever, chills, wheezing, cough, increased SOB, chest pain, open sores or wounds. Vital signs: /75   Pulse 135   Temp 96.9 °F (36.1 °C)   Resp 16   Ht 5' 4\" (1.626 m)   Wt 109 lb (49.4 kg)   SpO2 100%   BMI 18.71 kg/m²     Allergies:  Ibuprofen    Medications:    Prior to Admission medications    Medication Sig Start Date End Date Taking? Authorizing Provider   folic acid (FOLVITE) 1 MG tablet Take 1 mg by mouth daily   Yes Historical Provider, MD   HYDROcodone-acetaminophen (NORCO) 5-325 MG per tablet Take 1 tablet by mouth every 6 hours as needed for Pain. Yes Historical Provider, MD   albuterol sulfate HFA (VENTOLIN HFA) 108 (90 Base) MCG/ACT inhaler Inhale 2 puffs into the lungs every 6 hours as needed for Wheezing 3/13/20  Yes Bruce Captain P Blood, DO   fluticasone-salmeterol (ADVAIR) 250-50 MCG/DOSE AEPB Inhale 1 puff into the lungs 2 times daily 3/13/20  Yes Hepzibah Captain P Blood, DO   Cholecalciferol (VITAMIN D3) 50 MCG (2000 UT) CAPS Take 1 capsule by mouth daily   Yes Historical Provider, MD   Multiple Vitamin (MULTIVITAMIN) tablet Take 1 tablet by mouth daily (Tab-A-Robert)   Yes Historical Provider, MD   pantoprazole (PROTONIX) 40 MG tablet Take 40 mg by mouth daily   Yes Historical Provider, MD         This is a 64 y.o. female who is pleasant, cooperative, alert and oriented x3, in no acute distress.     Heart: Heart sounds are normal.  Repeat apical

## 2020-10-30 NOTE — PROGRESS NOTES
Patient unable to tolerated right thoracentesis without severe pain. Unable to obtain any fluid. Patient updated and all questions answered. Dressing to site. Discharge instructions given, no questions at this time. Patient discharged home with family.

## 2020-12-14 PROBLEM — C34.91 ADENOCARCINOMA, LUNG, RIGHT (HCC): Status: ACTIVE | Noted: 2020-01-01

## 2020-12-14 PROBLEM — R63.6 SEVERELY UNDERWEIGHT ADULT: Status: ACTIVE | Noted: 2020-01-01

## 2020-12-14 PROBLEM — R00.0 SINUS TACHYCARDIA: Status: ACTIVE | Noted: 2020-01-01

## 2020-12-14 PROBLEM — J18.9 PNEUMONIA: Status: ACTIVE | Noted: 2020-01-01

## 2020-12-14 PROBLEM — R06.00 DYSPNEA: Status: ACTIVE | Noted: 2020-01-01

## 2020-12-14 NOTE — PROGRESS NOTES
Pt transferred from ER via cart, pt oriented to room and bed controls, pt placed on telemetry, assessment and vitals complete, continuous pulse ox initiated and 99% on 2L/nc, telemetry shows sinus tach 130's, Dr. Wild Late informed, new orders received

## 2020-12-14 NOTE — H&P
[facility]  Office: [address], DO, Jojo Daniels DO, Jessika Guerrero DO, Lena Saunders, DO, Bradley Simmons MD, Jen Peguero MD, Hilda Yoder MD, Jovanna Wolfe MD, Denilson Buchanan MD, Mega Tracey MD, Delano Quintero MD, Freda Chris MD, Ellis Rothman MD, Pam Leung DO, Leah Colon MD, Jennifer Montes MD, Luz Elena Cassidy DO, Alexander Hand MD,  Pb Mera DO, Hernan Cano MD, Lulu Alarcon MD, Shaka Miles, [facility], [facility], CNP, Max Guajardo, CNP, Alo Queen, CNS, Saran Blanton, CNP, Claude Leep, CNP, Leslye Hall, CNP, Ester Arnold, CNP, Caty Trotter, CNP, Ramiro Bradley PA-C, Mable Barr, [facility], Ashley Hernandez, CNP, Susanne Mcghee, CNP, Cheng Wheatley, CNP, Joshua Rodriguez, CNP, Arnie Puckett, [address]    HISTORY AND PHYSICAL EXAMINATION            Date:   12/14/2020  Patient name:  Roosevelt Schofield  Date of admission:  12/14/2020 11:03 AM  MRN:   [id number]  Account:  [de-identified]  YOB: 1960  PCP:    Sonny Deras MD  Room:   1017/1017-02  Code Status:    Full Code    Chief Complaint:     Chief Complaint   Patient presents with    Shortness of Breath     History Obtained From:     patient, electronic medical record    History of Present Illness:     Roosevelt Schofield is a 64 y.o.  female who presented to our facility today for evaluation of shortness of breath. Her symptoms have been slowly progressing over the last month. She describes symptoms of orthopnea - not being able to lie completely flat. She denies fevers or chills. No productive cough, but admits to a dry cough. She states that she cannot get up to go to the bathroom without getting extremely winded.     Her comorbidities include known stage IV adenocarcinoma (diagnosed in April of 2020) (see below onc history) ([id number]) of the right lung with a brain metastases to her right lobe of the cerebellum s/p sterotactic radiosurgery (6/2020). She recently attempted to undergo a thoracentesis of the right lung for fluid removal, however, the procedure was too painful and was aborted. MRI in September revealed resolution of the right cerebellar ring-enhancing lesion with now new evidence of metastatic disease noted. Her oncologic history is as follows, as documented from [address] NP:     [facility] of the chest performed on 03/10/2020 demonstrated a consolidative mass in the right upper lobe of the lung with satellite nodules in the bilateral lungs. There is also a large right pleural effusion. The patient underwent a thoracentesis on 03/13/2020 with 600 mL removed with cytology negative for malignancy. Bronchoscopy and brushings in March 2020 revealed only atypical cells. On 04/09/2020 the patient underwent repeat right thoracentesis which removed 350 mL which was negative for malignancy. She also underwent CT-guided biopsy at that time of the right upper lobe mass with pathology demonstrating moderately differentiated adenocarcinoma. PET/CT scan from 04/16/2020 demonstrated multiple nodules in the base of the right neck with an SUV of 5. There is a right upper lobe mass measuring 6.6 x 6.6 cm with an SUV of 7.6. There is an AP window lymph node measuring 2 cm in size with an SUV of 5.1. There are multiple right-sided pleural-based nodules with an SUV of 6.5. A right pleural effusion was present. Subcarinal lymphadenopathy was present with an SUV of 2.7. There also multiple subcentimeter nodules throughout the left lung, worrisome for metastatic disease, with an SUV of 2.3. There is also a 1 cm right lobe of the liver nodule with an SUV of 3.6 also thought to be worrisome for metastatic disease.  MRI of the brain from 05/13/2020 demonstrated a single 1.0 x 0.7 cm right cerebellar enhancing nodule with adjacent vasogenic edema consistent with metastatic disease. Primary adenocarcinoma of upper lobe of right lung (CMS-HCC)   5/13/2020 - Cancer Staged   Staging form: Lung, AJCC 8th Edition  - Clinical stage from 5/13/2020: Stage IVB (cT4, cN3, cM1c) - Signed by Michela Connelly MD on 5/20/2020 5/20/2020 Initial Diagnosis   Primary adenocarcinoma of upper lobe of right lung (CMS-HCC)    Brain metastasis (CMS-HCC)   5/20/2020 Initial Diagnosis   Brain metastasis (CMS-HCC)       Past Medical History:     Past Medical History:   Diagnosis Date    Asthma     Cancer ([address]     lung cancer    Lung mass     PUD (peptic ulcer disease)        Hepatitis C    Food insecurity    Postmenopausal    Screening for colon cancer    Vitamin D deficiency    Hepatitis A immune    History of thrombocytopenia    Elevated MCV    Tobacco abuse    Moderate persistent asthma without complication    PUD (peptic ulcer disease)    Cirrhosis (CMS-HCC)    Mass of right lung    Primary adenocarcinoma of upper lobe of right lung (CMS-HCC)    Brain metastasis (CMS-HCC)    History of external beam radiation therapy       Past Surgical History:     Past Surgical History:   Procedure Laterality Date    BRONCHOSCOPY N/A 3/13/2020    BRONCHOSCOPY BRUSHINGS AND WASHING performed by Joslyn Messina MD at [address] [place] [address]      HYSTERECTOMY      IR PORT PLACEMENT < 5 YEARS          Medications Prior to Admission:     Prior to Admission medications    Medication Sig Start Date End Date Taking? Authorizing Provider   oxyCODONE (ROXICODONE) 5 MG immediate release tablet Take 5 mg by mouth every 8 hours as needed.  11/17/20   Historical Provider, MD   folic acid (FOLVITE) 1 MG tablet Take 1 mg by mouth daily    Historical Provider, MD   albuterol sulfate HFA (VENTOLIN HFA) 108 (90 Base) MCG/ACT inhaler Inhale 2 puffs into the lungs every 6 hours as needed for Wheezing 3/13/20   Bud Saunders, DO fluticasone-salmeterol (ADVAIR) 250-50 MCG/DOSE AEPB Inhale 1 puff into the lungs 2 times daily 3/13/20   Jorge Malagon P Blood, DO   Cholecalciferol (VITAMIN D3) 50 MCG (2000 UT) CAPS Take 1 capsule by mouth daily    Historical Provider, MD   Multiple Vitamin (MULTIVITAMIN) tablet Take 1 tablet by mouth daily (Tab-A-Robert)    Historical Provider, MD   pantoprazole (PROTONIX) 40 MG tablet Take 40 mg by mouth daily    Historical Provider, MD        Allergies:     Ibuprofen    Social History:     Tobacco:    reports that she has quit smoking. She smoked 0.50 packs per day. She has never used smokeless tobacco.  Alcohol:      reports current alcohol use of about 6.0 standard drinks of alcohol per week. Drug Use:  reports current drug use. Family History:     Family History   Problem Relation Age of Onset    Hypertension Mother     Heart Disease Mother        Review of Systems:     Positive and Negative as described in HPI.     CONSTITUTIONAL:  negative for fevers, chills, sweats, fatigue, weight loss  HEENT:  negative for vision, hearing changes, runny nose, throat pain  RESPIRATORY: reports shortness of breath and dry cough; occasionally coughs up sputum and blood   CARDIOVASCULAR:  negative for chest pain, palpitations  GASTROINTESTINAL:  negative for nausea, vomiting, diarrhea, constipation, change in bowel habits, abdominal pain   GENITOURINARY:  negative for difficulty of urination, burning with urination, frequency  INTEGUMENT:  negative for rash, skin lesions, easy bruising   HEMATOLOGIC/LYMPHATIC:  negative for swelling/edema   ALLERGIC/IMMUNOLOGIC:  negative for urticaria , itching  ENDOCRINE:  negative increase in drinking, increase in urination, hot or cold intolerance  MUSCULOSKELETAL:  negative joint pains, muscle aches, swelling of joints  NEUROLOGICAL:  negative for headaches, dizziness, lightheadedness, numbness, pain, tingling extremities  BEHAVIOR/PSYCH:  negative for depression, anxiety    Physical Exam:   BP 97/64   Pulse 132   Temp 98.1 °F (36.7 °C) (Oral)   Resp 18   Ht 5' 1\" (1.549 m)   Wt 80 lb (36.3 kg)   SpO2 100%   BMI 15.12 kg/m²   Temp (24hrs), Av.3 °F (36.8 °C), Min:98.1 °F (36.7 °C), Max:98.5 °F (36.9 °C)    No results for input(s): POCGLU in the last 72 hours. No intake or output data in the 24 hours ending 20 1712    General Appearance:  alert, well appearing, and in no acute distress, frail  female lying in bed, malnourished  Mental status: oriented to person, place, and time  Head:  normocephalic, atraumatic  Eye: no icterus, redness, pupils equal and reactive, extraocular eye movements intact, conjunctiva clear  Ear: normal external ear, no discharge, hearing intact  Nose:  no drainage noted  Mouth: mucous membranes moist  Neck: supple, no carotid bruits, thyroid not palpable  Chest wall: left anterior chest wall port present  Lungs: nearly no air movement in the right lung field; left lung field is clear but with fine crackles present  Cardiovascular: tachycardia, regular rhythm, no murmur, gallop, rub.   Abdomen: Soft, nontender, nondistended, normal bowel sounds, no hepatomegaly or splenomegaly  Neurologic: There are no new focal motor or sensory deficits, normal muscle tone and bulk, no abnormal sensation, normal speech, cranial nerves II through XII grossly intact  Skin: No gross lesions, rashes, bruising or bleeding on exposed skin area  Extremities:  peripheral pulses palpable, no pedal edema or calf pain with palpation, atrophic limbs  Psych: normal affect     Investigations:      Laboratory Testing:  Recent Results (from the past 24 hour(s))   EKG 12 Lead    Collection Time: 20 11:07 AM   Result Value Ref Range    Ventricular Rate 134 BPM    Atrial Rate 134 BPM    P-R Interval 126 ms    QRS Duration 68 ms    Q-T Interval 304 ms    QTc Calculation (Bazett) 453 ms    P Axis 76 degrees    R Axis 29 degrees    T Axis 55 degrees   CBC Auto Time: 12/14/20 11:34 AM   Result Value Ref Range    Procalcitonin 0.13 (H) <0.09 ng/mL   COVID-19    Collection Time: 12/14/20 12:41 PM    Specimen: Other   Result Value Ref Range    SARS-CoV-2          SARS-CoV-2, Rapid Not Detected Not Detected    Source . NASOPHARYNGEAL SWAB     SARS-CoV-2             Imaging/Diagnostics:  Xr Chest Portable    Result Date: 12/14/2020  Stable right-sided cardiopulmonary findings. New airspace disease identified throughout the left hemithorax, concerning for multifocal pneumonia. Assessment :      Hospital Problems           Last Modified POA    * (Principal) Dyspnea 12/14/2020 Yes    Adenocarcinoma, lung, right ([address] 12/14/2020 Yes    Sinus tachycardia 12/14/2020 Yes    Severely underweight adult 12/14/2020 Yes    Pneumonia 12/14/2020 Yes          Plan:     Patient status inpatient in the Progressive Unit/Step down    1. Pulmonology consultation  2. Hep-Lock IV fluids  3. Check CT chest to r/o PE. This will also give us good imaging of her chest/lungs  4. Continue antibiotics at this time. Doubt pneumonia with normal procal and onset of symptoms over a month ago, but given her immunocompromised status, will continue to cover with antibiotics  5. Check 2D echo  6. One-time dose of Lasix 40 mg IV push  7. Albuterol/duo nebs  8. Blood cultures pending  9. DVT ppx    Consultations:   IP CONSULT TO HOSPITALIST    Patient is admitted as inpatient status because of co-morbidities listed above, severity of signs and symptoms as outlined, requirement for current medical therapies and most importantly because of direct risk to patient if care not provided in a hospital setting. Expected length of stay > 48 hours.     Xu Altman DO  12/14/2020  5:12 PM    Copy sent to Dr. Foster MD

## 2020-12-14 NOTE — ED NOTES
Pt placed on portable tele and pulse ox. Pt adjusted in bed and states comfort.  TV set to channel pt requested        Gilmer Cormier RN  12/14/20 [id number]

## 2020-12-14 NOTE — ED PROVIDER NOTES
[address] [place] [address] ED  EMERGENCY DEPARTMENT ENCOUNTER      Pt Name: Júnior Hooker  MRN: [id number]  [place] 1960  Date of evaluation: 12/14/2020  Provider: Cornelio Rossi MD    [address] [place] [address]       Chief Complaint   Patient presents with    Shortness of Breath         HISTORY OF PRESENT ILLNESS  (Location/Symptom, Timing/Onset, Context/Setting, Quality, Duration, Modifying Factors, Severity.)   Júnior Hooker is a 64 y.o. female who presents to the emergency department for shortness of breath. This is an ongoing issue for many months. She has lung cancer. She has not had a fever or significant productive cough. She does not complain of any pain. She states she gets quite short of breath when she walks in her home and she feels like she needs oxygen at home. Nursing Notes were reviewed. ALLERGIES     Ibuprofen    CURRENT MEDICATIONS       Previous Medications    ALBUTEROL SULFATE HFA (VENTOLIN HFA) 108 (90 BASE) MCG/ACT INHALER    Inhale 2 puffs into the lungs every 6 hours as needed for Wheezing    CHOLECALCIFEROL (VITAMIN D3) 50 MCG (2000 UT) CAPS    Take 1 capsule by mouth daily    FLUTICASONE-SALMETEROL (ADVAIR) 250-50 MCG/DOSE AEPB    Inhale 1 puff into the lungs 2 times daily    FOLIC ACID (FOLVITE) 1 MG TABLET    Take 1 mg by mouth daily    HYDROCODONE-ACETAMINOPHEN (NORCO) 5-325 MG PER TABLET    Take 1 tablet by mouth every 6 hours as needed for Pain.     MULTIPLE VITAMIN (MULTIVITAMIN) TABLET    Take 1 tablet by mouth daily (Tab-A-Robert)    PANTOPRAZOLE (PROTONIX) 40 MG TABLET    Take 40 mg by mouth daily       PAST MEDICAL HISTORY         Diagnosis Date    Asthma     Cancer ([address]     lung cancer    Lung mass     PUD (peptic ulcer disease)        SURGICAL HISTORY           Procedure Laterality Date    BRONCHOSCOPY N/A 3/13/2020    BRONCHOSCOPY BRUSHINGS AND WASHING performed by Garima Herrera MD at [address] [place] [address] PLACEMENT < 5 YEARS           FAMILY HISTORY           Problem Relation Age of Onset    Hypertension Mother     Heart Disease Mother      Family Status   Relation Name Status    Mother  Alive    Father          SOCIAL HISTORY      reports that she has quit smoking. She smoked 0.50 packs per day. She has never used smokeless tobacco. She reports current alcohol use of about 6.0 standard drinks of alcohol per week. She reports current drug use. REVIEW OF SYSTEMS    (2-9 systems for level 4, 10 or more for level 5)     Review of Systems   Constitutional: Negative for chills, fatigue and fever. HENT: Negative for congestion, ear discharge and facial swelling. Eyes: Negative for discharge and redness. Respiratory: Positive for shortness of breath. Negative for cough. Cardiovascular: Negative for chest pain. Gastrointestinal: Negative for abdominal pain, constipation, diarrhea and vomiting. Genitourinary: Negative for dysuria and hematuria. Musculoskeletal: Negative for arthralgias. Skin: Negative for color change and rash. Neurological: Negative for syncope, numbness and headaches. Hematological: Negative for adenopathy. Psychiatric/Behavioral: Negative for confusion. The patient is not nervous/anxious. Except as noted above the remainder of the review of systems was reviewed and negative. PHYSICAL EXAM    (up to 7 for level 4, 8 or more for level 5)     Vitals:    20 1106 20 1256   BP: 116/78    Pulse: 140 118   Resp: 18 18   Temp: 98.5 °F (36.9 °C)    SpO2: 94% 98%   Weight: 80 lb (36.3 kg)    Height: 5' 1\" (1.549 m)        Physical Exam  Constitutional:       General: She is not in acute distress. Appearance: She is well-developed. She is not diaphoretic. Comments: She is quite thin, appears cachectic. HENT:      Head: Normocephalic and atraumatic. Eyes:      General: No scleral icterus. Right eye: No discharge.          Left eye: No discharge. Neck:      Musculoskeletal: Neck supple. Cardiovascular:      Rate and Rhythm: Regular rhythm. Tachycardia present. Pulmonary:      Effort: Pulmonary effort is normal. No respiratory distress. Breath sounds: Normal breath sounds. No stridor. No wheezing or rales. Abdominal:      General: There is no distension. Palpations: Abdomen is soft. Tenderness: There is no abdominal tenderness. Musculoskeletal: Normal range of motion. Lymphadenopathy:      Cervical: No cervical adenopathy. Skin:     General: Skin is warm and dry. Findings: No erythema or rash. Neurological:      Mental Status: She is alert and oriented to person, place, and time. Psychiatric:         Behavior: Behavior normal.             DIAGNOSTIC RESULTS     EKG: All EKG's are interpreted by the Emergency Department Physician who either signs or Co-signs this chart in the absence of a cardiologist.    EKG on my interpretation shows sinus tachycardia with rate of 134. RADIOLOGY:   Non-plain film images such as CT, Ultrasound and MRI are read by the radiologist. Jose E Cleverly radiographic images are visualized and preliminarily interpreted by the emergency physician with the below findings:    Interpretation per the Radiologist below, if available at the time of this note:    Xr Chest Portable    Result Date: 12/14/2020  EXAMINATION: ONE XRAY VIEW OF THE CHEST 12/14/2020 12:10 pm COMPARISON: 10/30/2020 HISTORY: ORDERING SYSTEM PROVIDED HISTORY: sob TECHNOLOGIST PROVIDED HISTORY: sob Reason for Exam: Port upright AP CXR - cough, sob, history of lung cancer Acuity: Unknown Type of Exam: Unknown FINDINGS: Vascular port remains unchanged positioning, terminating to left of midline. The right hemithorax remains unchanged including peripheral hyperdensity suggestive of loculated effusion and adjacent airspace disease. Loss of volume and midline shift to the right.   However, there has been interval development of diffuse airspace disease essentially throughout the left hemithorax. Stable right-sided cardiopulmonary findings. New airspace disease identified throughout the left hemithorax, concerning for multifocal pneumonia. LABS:  Labs Reviewed   CBC WITH AUTO DIFFERENTIAL - Abnormal; Notable for the following components:       Result Value    RBC 3.03 (*)     Hemoglobin 10.6 (*)     Hematocrit 32.9 (*)     .6 (*)     MCH 35.0 (*)     RDW 22.5 (*)     NRBC Automated 0.4 (*)     Lymphocytes 23 (*)     Monocytes 19 (*)     Eosinophils % 0 (*)     Immature Granulocytes 1 (*)     All other components within normal limits   BASIC METABOLIC PANEL - Abnormal; Notable for the following components:    Glucose 108 (*)     Bun/Cre Ratio 26 (*)     All other components within normal limits   LACTATE DEHYDROGENASE - Abnormal; Notable for the following components:     (*)     All other components within normal limits   CULTURE, BLOOD 1   CULTURE, BLOOD 1   COVID-19   C-REACTIVE PROTEIN   FERRITIN   PROCALCITONIN       All other labs were within normal range or not returned as of this dictation. EMERGENCY DEPARTMENT COURSE and DIFFERENTIAL DIAGNOSIS/MDM:   Vitals:    Vitals:    12/14/20 1106 12/14/20 1256   BP: 116/78    Pulse: 140 118   Resp: 18 18   Temp: 98.5 °F (36.9 °C)    SpO2: 94% 98%   Weight: 80 lb (36.3 kg)    Height: 5' 1\" (1.549 m)        Orders Placed This Encounter   Medications    0.9 % sodium chloride bolus       Medical Decision Making: The patient appears to have pneumonia on her CAT scan. Blood cultures were obtained and she was given IV antibiotic. Coronavirus test was negative. She is being admitted. Treatment diagnosis and disposition were discussed with the patient. CONSULTS:  IP CONSULT TO HOSPITALIST    PROCEDURES:  None    FINAL IMPRESSION      1.  Pneumonia due to organism          DISPOSITION/PLAN   DISPOSITION Decision To Admit 12/14/2020 01:22:35 PM      PATIENT REFERRED TO:   No follow-up provider specified.     DISCHARGE MEDICATIONS:     New Prescriptions    No medications on file         (Please note that portions of this note were completed with a voice recognition program.  Efforts were made to edit the dictations but occasionally words are mis-transcribed.)    Allie Reese MD  Attending Emergency Physician         Allie Reese MD  12/14/20 0654

## 2020-12-14 NOTE — ED NOTES
Pt to restroom via wheelchair with steady gait transferring     [facility]  12/14/20 0584

## 2020-12-14 NOTE — ED NOTES
Bed sheets straightened.  Pt given meal tray     [facility], [address] [place] [address]  12/14/20 6611

## 2020-12-14 NOTE — ED NOTES
Bed: 15  Expected date: 12/14/20  Expected time: 10:56 AM  Means of arrival: [address]  Comments:  Medic 18     Chalo Horan, [facility]  12/14/20 0748

## 2020-12-14 NOTE — CARE COORDINATION
Case Management Initial Discharge Plan  Deja Stone Risk              Risk of Unplanned Readmission:        0             Met with:patient to discuss discharge plans. Information verified: address, contacts, phone number, , insurance Yes  PCP: Kelly Mathis MD  Date of last visit: 2 weeks ago    Insurance Provider: [employer]    Discharge Planning  Current Residence:   apartment  Living Arrangements:   alone   Home has 2 stories/7 stairs to climb  Support Systems:   siblings  Current Services PTA:   no Supplier: none  Patient able to perform ADL's:Independent  DME used to aid ambulation prior to admission: none  During admission: none    Potential Assistance Needed:   home care and DME (O2)    Pharmacy: [facility]   Potential Assistance Purchasing Medications:   no  Does patient want to participate in local refill/ meds to beds program?   no    Patient agreeable to home care: Yes  Freedom of choice provided:  yes      Type of Home Care Services:     Patient expects to be discharged to:   home vs snf    Prior SNF/Rehab Placement and Facility: no  Agreeable to SNF/Rehab: tbd  [place] of choice provided: yes   Evaluation: yes    Expected Discharge date: Follow Up Appointment: Best Day/ Time:      Transportation provider: lio  Transportation arrangements needed for discharge: Yes    Discharge Plan:   Patient lives alone in 2nd floor apartment with 7 steps to enter. Patient was independent with adl's, however patient stated she may need dme and oxygen. Patient denied any prior services at home. Patient denied any drug or alcohol use. SBIRT completed. Patient admitted for Pneumonia. Discharge needs tbd.         Electronically signed by JUSTIN Wesley on 20 at 2:37 PM EST

## 2020-12-14 NOTE — PROGRESS NOTES
Patient weight is below 45 kg. For routine prophylaxis with enoxaparin, Pharmacy adjusted the dose to account for the patient's decreased body weight and potential for increased bleeding in accordance with hospital approved protocol. The dose has been changed to 30mg SQ daily. Please contact Pharmacy with any questions @ [phone].   Thank you.  12/14/2020 5:01 PM   Aby Chan [address]

## 2020-12-15 PROBLEM — E43 SEVERE MALNUTRITION (HCC): Status: ACTIVE | Noted: 2020-01-01

## 2020-12-15 PROBLEM — J96.22 ACUTE ON CHRONIC RESPIRATORY FAILURE WITH HYPOXIA AND HYPERCAPNIA (HCC): Status: ACTIVE | Noted: 2020-01-01

## 2020-12-15 PROBLEM — J18.9 MULTIFOCAL PNEUMONIA: Status: ACTIVE | Noted: 2020-01-01

## 2020-12-15 PROBLEM — J96.21 ACUTE ON CHRONIC RESPIRATORY FAILURE WITH HYPOXIA AND HYPERCAPNIA (HCC): Status: ACTIVE | Noted: 2020-01-01

## 2020-12-15 NOTE — PROGRESS NOTES
[facility]  Office: [address], DO, Guy Olivarez, DO, Grace Baron, DO, Tez Joya Blood, DO, Daron Underwood MD, Pepe Cooper MD, Marshal Parson MD, Chris Katz MD, Jeff Eastman MD, Thu Tucker MD, Lois Cramer MD, Emily Maldonado MD, Ellis Argueta MD, Domenica Ellis, DO, Sammy Jacobson MD, Dorinda Eli MD, Jerry Callahan DO, Griselda Wilson MD,  Amaya Herrera DO, Antwon Allred MD, Alicia Hwang MD, Annabel Mahoney, CNP, [facility], CNP, Adonis Calzada, CNP, Mira Mask, CNS, Chioma Queen, CNP, Rachael Ramon, CNP, Romeo Brown, CNP, Aaron Lora, CNP, Teddy Gutierrez, CNP, Lendel Nissen, PA-C, Sergei Saunders, DNP, Carmen Che, CNP, Selam Saldaña, CNP, Taina Gonzalez, CNP, Josh Ho, CNP, Chandrakant Greene, CNP         [facility]   [address]    Progress Note    12/15/2020    11:39 AM    Name:   Geneva Blum  MRN:     [id number]     Acct:      [de-identified]   Room:   1017/1017-02   Day:  1  Admit Date:  12/14/2020 11:03 AM    PCP:   Tammy Londono MD  Code Status:  Full Code    Subjective:     C/C:   Chief Complaint   Patient presents with    Shortness of Breath     Interval History Status: not changed. No change in condition. Discussion is held with the patient and she seems to have a lack of understanding of the extent of her disease. Patient does not seem to understand that she has metastatic changes to the brain and liver and diffusely through the lungs. Oncology consultation recommends prednisone 1 mg/kg for likely pneumonitis secondary to pembrolizumab. Infection unlikely as white count and procalcitonin are WNL. De-escalate antibiotic therapy. Patient is tachycardic however she has significant electrolyte abnormalities including hypokalemia and hypomagnesia, we will replace these and reevaluate this afternoon.     Brief History:     12/14 -stage IV metastatic lung cancer admitted for shortness of alcohol use of about 6.0 standard drinks of alcohol per week. She reports current drug use. Family History:   Family History   Problem Relation Age of Onset    Hypertension Mother     Heart Disease Mother        Vitals:  BP 89/62   Pulse 127   Temp 98.2 °F (36.8 °C) (Oral)   Resp 16   Ht 5' 1\" (1.549 m)   Wt 82 lb 6 oz (37.4 kg)   SpO2 91%   BMI 15.56 kg/m²   Temp (24hrs), Av.2 °F (36.8 °C), Min:98.1 °F (36.7 °C), Max:98.4 °F (36.9 °C)    No results for input(s): POCGLU in the last 72 hours. I/O (24Hr): Intake/Output Summary (Last 24 hours) at 12/15/2020 1139  Last data filed at 2020 1837  Gross per 24 hour   Intake 250 ml   Output --   Net 250 ml       Labs:  Hematology:  Recent Labs     20  1134 12/14/20  2050 12/15/20  0442   WBC 4.8  --  4.9   RBC 3.03*  --  2.39*   HGB 10.6*  --  8.3*   HCT 32.9*  --  26.2*   .6*  --  109.6*   MCH 35.0*  --  34.7*   MCHC 32.2  --  31.7   RDW 22.5*  --  22.5*     --  125*   MPV 11.1  --  11.9   CRP 15.4*  --   --    DDIMER  --  3.71*  --      Chemistry:  Recent Labs     20  1134 12/15/20  0442    136   K 4.4 3.4*    101   CO2 23 24   GLUCOSE 108* 94   BUN 17 14   CREATININE 0.65 0.74   MG  --  1.2*   ANIONGAP 15 11   LABGLOM >60 >60   GFRAA >60 >60   CALCIUM 9.5 8.4*     Recent Labs     20   *     ABG:No results found for: POCPH, PHART, PH, POCPCO2, MRI3SXC, PCO2, POCPO2, PO2ART, PO2, POCHCO3, MQU6WAD, HCO3, NBEA, PBEA, BEART, BE, THGBART, THB, VCR1UEK, JRTE3GXI, E7VCFVZK, O2SAT, FIO2  Lab Results   Component Value Date/Time    SPECIAL LH, 10ML, RB 2020 12:40 PM    SPECIAL RIGHT AC, 10ML, KM 2020 12:40 PM     Lab Results   Component Value Date/Time    CULTURE NO GROWTH 8 HOURS 2020 12:40 PM    CULTURE NO GROWTH 8 HOURS 2020 12:40 PM       Radiology:  Xr Chest Portable    Result Date: 2020  Stable right-sided cardiopulmonary findings.  New airspace disease identified throughout the left hemithorax, concerning for multifocal pneumonia. Ct Chest Pulmonary Embolism W Contrast    Result Date: 12/14/2020  1. No evidence of pulmonary embolism 2. Severe narrowing of the right upper and lower lobe branches of the right pulmonary artery, secondary to the central obstructing lesion and pleural based metastatic disease 3. Interval development of extensive pleural based soft tissue changes throughout the right hemithorax, consistent with metastatic disease 4. Sclerotic and lytic changes now present involving the right 3rd, 4th, and 8th ribs, consistent with metastatic disease 5. Extensive airspace disease now present within the left upper and lower lobes. Differential considerations would include infection, versus post radiation pneumonitis 6. Interval decrease in size of the consolidative mass, right upper lobe, and mediastinal and left hilar adenopathy       Physical Examination:        General appearance:  alert, cooperative and in chronic respiratory distress, she is frail and cachectic  Mental Status:  oriented to person, place and time with an anxious and aggressive affect  Lungs: Increased respiratory effort, diffuse rhonchi, assessment limited by persistent cough  Heart:  regular rate and rhythm, no murmur, tachycardic  Abdomen:  soft, nontender, nondistended, normal bowel sounds, no masses, hepatomegaly, splenomegaly  Extremities:  no edema, redness, tenderness in the calves  Skin:  no gross lesions, rashes, induration    Assessment:        Hospital Problems           Last Modified POA    * (Principal) Multifocal pneumonia 12/15/2020 Yes    Overview Signed 12/15/2020  9:12 AM by Jorge L Saunders, DO     Likely bacterial         Adenocarcinoma, lung, right ([address] 12/14/2020 Yes    Sinus tachycardia 12/14/2020 Yes    Severely underweight adult 12/14/2020 Yes          Plan:        1. Pulmonology consultation recommendations are appreciated  2.  Oncology recommendations are appreciated  3. Discontinue broad-spectrum antibiotic coverage, neuro to oral antibiotic  4. Prednisone 1 mg/kg per oncology recommendation with glycemic monitoring and control  5. Palliative care consultation, goals for care need to be established  6. Electrolyte correction and reevaluation of tachycardia after magnesium and potassium normalized.     FAHAD Sabillon - NP  12/15/2020  11:39 AM

## 2020-12-15 NOTE — PLAN OF CARE
Problem: Pain:  Goal: Patient's pain/discomfort is manageable  Description: Patient's pain/discomfort is manageable  Outcome: Ongoing  Note: PRN pain medication available. Problem: Falls - Risk of:  Goal: Absence of physical injury  Description: Absence of physical injury  Outcome: Ongoing  Note: Patient alert, oriented, and calls out appropriately. Problem: Infection:  Goal: Will remain free from infection  Description: Will remain free from infection  Outcome: Ongoing  Note: Patient on IV antibiotics. Problem: Daily Care:  Goal: Daily care needs are met  Description: Daily care needs are met  Outcome: Ongoing  Note: Patient hourly rounded on to assess needs.

## 2020-12-15 NOTE — FLOWSHEET NOTE
Rapid Response was called over head 1330 pm.  Rapid response team was present. PANTERA Arevalo states   Patient responsive,appeared, anxious, agitated. PANTERA Flood states stats were low, oxygen was low. Patient is ben have a Bi pap placed. No family present. Augustus Arevalo states brother as a contact no children listed.  shared in presence, silent prayer. Follow up as needed. 12/15/20 1348   Encounter Summary   Services provided to: Patient not available   Referral/Consult From: Multi-disciplinary team   Support System Family members   Continue Visiting   (12-15-20/ Spoke to RN's only)   Complexity of Encounter Moderate   Length of Encounter 30 minutes   Spiritual Assessment Completed Yes   Routine   Type Follow up   Crisis   Type Rapid response   Assessment Anxious;Passive   Intervention Explored feelings, thoughts, concerns;Prayer;Sustaining presence/ Ministry of presence; Discussed illness/injury and it's impact   Outcome Expressed gratitude;Engaged in conversation;Receptive

## 2020-12-15 NOTE — PLAN OF CARE
[facility]  Office: [address], DO, Fernanda Johnston, DO, Rusty Patee, DO, Smiley Bonner Blood, DO, Jean Carlos German MD, Noemí Naik MD, Bhaskar Jenkins MD, Savanna Oliveros MD, Bayron Duarte MD, Misha Goodwin MD, Camila Vann MD, Mayito Ambriz MD, Ellis Sr MD, Temitope Doyle, DO, Jocelin Poole MD, Katrina Bautista MD, Elsa Scarce, DO, Dat Rice MD,  Lachelle Rota, DO, Javid Do MD, Rosangela Turner MD, Garrick Caro, CNP, [facility] Me[facility], CNP, García Soliman, CNP, Sandy Young, CNS, Anamika Ogden, CNP, Anjelica Blum, CNP, Rashad Grant, CNP, Heddy Sacks, CNP, Anastasia Daugherty, CNP, Rina Canada PA-C, Zeeshan Hernandez, DNP, Lee Carbon, CNP, Mirtha Alvarenga, CNP, Thee Chowdary, CNP, Cam Dural, CNP, Peter Comfort, CNP         [facility]   [address] [facility]    Second Visit Note  For more detailed information please refer to the progress note of the day      12/15/2020    1:49 PM    Name:   Luann Mullen  MRN:     [id number]     Acct:      [de-identified]   Room:   [address] [place] [address] Day:  1  Admit Date:  12/14/2020 11:03 AM    PCP:   Lizeth Oden MD  Code Status:  Full Code      Pt vitals were reviewed   New labs were reviewed   Patient was seen    Updated plan :     1. High flow O2 per NRB now, add ABG, chest x-ray  2. ABG results reviewed, respiratory acidosis identified  3. Initiate BiPAP  4. Transfer to ICU  5. Precedex for agitation  6. Initiate IV dexamethasone and discontinue oral prednisone due to noncompliance with oral regiment  7.  Patient remains full code          FAHAD Lloyd - NP  12/15/2020  1:49 PM

## 2020-12-15 NOTE — PROGRESS NOTES
Dr. Elisha Vázquez was consulted for patient's dyspnea and history of adenocarcinoma. Secure message sent with updates on patient\"s vital signs and CT results. Writer waiting for a response.

## 2020-12-15 NOTE — PROGRESS NOTES
Physical Therapy    Facility/Department: [facility] PROGRESSIVE CARE  Initial Assessment    NAME: Darius Davis  : 1960  MRN: [id number]    Date of Service: 12/15/2020    Discharge Recommendations:  Subacute/Skilled Nursing Facility        Assessment   Assessment: Pt. tearful at times t/o session, speaking of how life was so hard prior to admission as she had no endurance/ max SOB to point where she could not stand to get food, wash up at sink, etc.  Pt. is in need of home services and DME. Will initiate this here, however pt. needs to d/c to SNF where they can properly prepare her returning home alone. Prognosis: Good  Decision Making: High Complexity  PT Education: Goals;PT Role;Plan of Care;General Safety; Energy Conservation  Patient Education: importance of OOB for pneumina prevention, increasing strength  REQUIRES PT FOLLOW UP: Yes  Activity Tolerance  Activity Tolerance: Patient limited by endurance       Patient Diagnosis(es): The encounter diagnosis was Pneumonia due to organism. has a past medical history of Asthma, Cancer ([facility] [address] Lung mass, and PUD (peptic ulcer disease). has a past surgical history that includes Dilation and curettage of uterus; bronchoscopy (N/A, 3/13/2020); Hysterectomy; and IR PORT PLACEMENT < 5 YEARS.     Restrictions  Restrictions/Precautions  Restrictions/Precautions: Fall Risk, General Precautions, Up as Tolerated  Position Activity Restriction  Other position/activity restrictions: alarms, up as brad, 2 L O2 per NC, RUE IV  Vision/Hearing  Vision: Impaired  Vision Exceptions: Wears glasses at all times(pt states her vision is getting worse)  Hearing: Exceptions to [facility]  Hearing Exceptions: Hard of hearing/hearing concerns     Subjective  General  Patient assessed for rehabilitation services?: Yes          Orientation  Orientation  Overall Orientation Status: Within Functional Limits  Social/Functional History  Social/Functional History  Lives With: Alone  Type of Home: Apartment  Home Layout: One level  Home Access: Stairs to enter with rails(full flight to second story apt.)  Bathroom Shower/Tub: Tub/Shower unit  Bathroom Equipment: (no DME)  Home Equipment: (no DME)  Receives Help From: Family(Pt states she has a cousin who will bring her groceries and drives as needed)  ADL Assistance: Independent(pt states she was I and has been struggling recently and not able to complete ADL tasks such as bathing)  Homemaking Assistance: Independent(pt states she was I however has not been able to complete lately and her house is unkempt)  Homemaking Responsibilities: Yes  Ambulation Assistance: Independent(admits to furniture walking)  Transfer Assistance: Independent  Active : No  Patient's  Info: pt states her cousin drives as needed  Leisure & Hobbies: watching TV  Additional Comments: Pt denies falls. She states that because of \"her pride,\" she has not asked for help at home. States for last month she has been going downhill to point the last few days prior to admission she could barely stand. She was not able to get herself food and states she has not bathed in a \"long time. \"  Her apt. is \"filthy\" as she has not been able to clean. Complemented pt's acrylic nails, which did not look very grown out, and pt. states she walked to the appt. This seemed conflicting with other info. pt. provided, so question pt's cognition. Cognition   Cognition  Overall Cognitive Status: Exceptions  Arousal/Alertness: Delayed responses to stimuli  Following Commands: Follows multistep commands with increased time; Follows multistep commands with repitition  Attention Span: Appears intact  Memory: Decreased short term memory  Safety Judgement: Decreased awareness of need for safety;Decreased awareness of need for assistance  Problem Solving: Assistance required to identify errors made;Assistance required to correct errors made;Assistance required to implement solutions;Assistance required to be CGA for sit to stand transfer  Short term goal 3: Pt. to be CGA for gait with RW, 25ft  Short term goal 4: Pt. to be introduced to adaptive equip. options, such as rollator (seat to rest), shower chair which will assist with energy conservation. Short term goal 5: Pt. to tolerate 25+ min. of PT for ther ex/ gait/ balance/ endurance training/ energy conservation edu. Patient Goals   Patient goals : breathe better; get assistance at home       Therapy Time   Individual Concurrent Group Co-treatment   Time In 0840         Time Out 0950         Minutes 70              Treatment time:  50min.       Yasemin Tamez, PT

## 2020-12-15 NOTE — FLOWSHEET NOTE
Per RN, patient is very anxious and might benefit from  visit. Patient in bed wearing nasal cannula; appears to be breathing heavily; appears agitated; complains she cannot breathe; states \"I'm dying\"; expresses feeling hot; later expresses feeling cold. Writer encourages patient to breathe slowly in through her nose and exhale through her mouth. Writer demonstrates. Patient begins to breathe more slowly. Writer encourages patient to imagine something calming for her; offers examples of his own \"calming place\". Patient begins to relax. Writer offers to pray. Patient agrees. Writer prays for patient. Writer remains with patient; continues to encourage her to breathe slowly and mindfully; continues to encourage her to rest in a \"calming place\". Patient more visibly calm; thanks writer; states she is feeling better. Writer updates RN on encounter. Spiritual Care will continue to follow as needed. 12/15/20 1226   Encounter Summary   Services provided to: Patient   Referral/Consult From: Alanna;Nurse   Continue Visiting   (12/15/20)   Complexity of Encounter Moderate   Length of Encounter 30 minutes   Spiritual Assessment Completed Yes   Routine   Type Initial   Crisis   Type Emotional distress   Assessment Anxious; Fearful;Helplessness   Intervention Active listening;Explored feelings, thoughts, concerns;Prayer   Outcome Expressed gratitude;Expressed feelings/needs/concerns;Receptive; De-escalated

## 2020-12-15 NOTE — CONSULTS
Ferritin 3,423, , Pro henrietta 0.13, and D-dimer 3.71. EKG on arrival revealed . CXR revealed stable right sided cardiopulmonary findings, and new airspace disease identified throughout the left hemithorax. CTA chest revealed no PE, but severe narrowing of the RUL and RLL branches of right pulmonary artery d/t central obstructing lesion and pleural based metastatic disease. Sclerotic/lytic changes involving 3d, 4th, and 8th rib also consistent with mets disease. Extensive airspace disease now present involving the TRACY and LLL, and interval decrease in size of consolidative  mass in RUL, mediastinal, and left hilar adenopathy. Echo revealed EF >65%, moderate to severe MR, and TR, and moderate pulmonary HTN. Cardiology was consulted for ST, and gave patient a fluid bolus. Pulmonary consulted for hypoxia/?pneumonia, and patient is on ATB's. Oncology consulted for stage IV cancer, and Dr. Cher Sood is patients primary Oncologist. RAT was called today on patient d/t Sp02 in 40's, and patient was lethargic. Nonrebreather applied and patient transported to ICU. ABG's revealed respiratory acidosis. CXR revealed stable diffuse bilateral pulmonary ground-glass opacity, and moderate to large loculated right pleural effusion vs pleural thickening. Patient is presently on Precedex drip and Bipap. She awakens to verbal stimuli, but gets easily frustrated not being able to come off Bipap to talk or take a drink. Palliative care consulted for review of goals, code status, symptom management, and support. 12/15 pertinent labs include; Kcl 3.4, Ca 8.4, Hgb 8.3, and plt 125.      Active Hospital Problems    Diagnosis Date Noted    Adenocarcinoma, lung, right ([facility] [address] [C34.91] 12/14/2020     Priority: High    Multifocal pneumonia [J18.9] 12/14/2020     Priority: High    Sinus tachycardia [R00.0] 12/14/2020     Priority: High    Severely underweight adult [R63.6] 12/14/2020     Priority: High    Acute on chronic respiratory failure with hypoxia and hypercapnia (HCC) [J96.21, J96.22] 12/15/2020    Severe malnutrition ([facility] [address] [E43] 12/15/2020       PAST MEDICAL HISTORY      Diagnosis Date    Asthma     Cancer ([facility]     lung cancer    Lung mass     PUD (peptic ulcer disease)        PAST SURGICAL HISTORY  Past Surgical History:   Procedure Laterality Date    BRONCHOSCOPY N/A 3/13/2020    BRONCHOSCOPY BRUSHINGS AND WASHING performed by Joslyn Messina MD at [address] [place] [address] PORT PLACEMENT < 5 YEARS         SOCIAL HISTORY  Social History     Tobacco Use    Smoking status: Former Smoker     Packs/day: 0.50    Smokeless tobacco: Never Used    Tobacco comment: quit 3/2020   Substance Use Topics    Alcohol use:  Yes     Alcohol/week: 6.0 standard drinks     Types: 6 Cans of beer per week     Comment: 2- beers a day, stopped 3/2020    Drug use: Yes     Comment: weed occasional, stopped 1/2020       ALLERGIES  Allergies   Allergen Reactions    Ibuprofen Hives     Other reaction(s): Unknown         MEDICATIONS  Current Medications    insulin lispro  0-12 Units Subcutaneous TID WC    insulin lispro  0-6 Units Subcutaneous Nightly    heparin (porcine)  5,000 Units Subcutaneous BID    dexamethasone  6 mg Intravenous Daily    folic acid  1 mg Oral Daily    therapeutic multivitamin-minerals  1 tablet Oral Daily    pantoprazole  40 mg Oral Daily    budesonide-formoterol  2 puff Inhalation BID    Vitamin D  2,000 Units Oral Daily    sodium chloride flush  10 mL Intravenous 2 times per day    ipratropium-albuterol  1 ampule Inhalation Q4H WA    cefTRIAXone (ROCEPHIN) IV  1 g Intravenous Q24H    azithromycin  500 mg Intravenous Q24H     potassium chloride **OR** potassium alternative oral replacement **OR** potassium chloride, magnesium sulfate, ALPRAZolam, glucose, dextrose, glucagon (rDNA), dextrose, metoprolol, oxyCODONE, sodium chloride flush, nicotine, promethazine **OR** ondansetron, magnesium hydroxide, acetaminophen **OR** acetaminophen, albuterol, sodium chloride flush  IV Drips/Infusions   sodium chloride 100 mL/hr at 12/15/20 1154    dextrose      dexmedetomidine (PRECEDEX) IV infusion 0.5 mcg/kg/hr (12/15/20 1502)     Home Medications  No current facility-administered medications on file prior to encounter. Current Outpatient Medications on File Prior to Encounter   Medication Sig Dispense Refill    oxyCODONE (ROXICODONE) 5 MG immediate release tablet Take 5 mg by mouth every 8 hours as needed.  folic acid (FOLVITE) 1 MG tablet Take 1 mg by mouth daily      albuterol sulfate HFA (VENTOLIN HFA) 108 (90 Base) MCG/ACT inhaler Inhale 2 puffs into the lungs every 6 hours as needed for Wheezing 1 Inhaler 3    fluticasone-salmeterol (ADVAIR) 250-50 MCG/DOSE AEPB Inhale 1 puff into the lungs 2 times daily 60 each 3    Cholecalciferol (VITAMIN D3) 50 MCG (2000 UT) CAPS Take 1 capsule by mouth daily      Multiple Vitamin (MULTIVITAMIN) tablet Take 1 tablet by mouth daily (Tab-A-Robert)      pantoprazole (PROTONIX) 40 MG tablet Take 40 mg by mouth daily         Data         /69   Pulse 148   Temp 97.5 °F (36.4 °C) (Oral)   Resp (!) 49   Ht 5' 1\" (1.549 m)   Wt 82 lb 6 oz (37.4 kg)   SpO2 (!) 88%   BMI 15.56 kg/m²     Wt Readings from Last 3 Encounters:   12/15/20 82 lb 6 oz (37.4 kg)   10/30/20 109 lb (49.4 kg)   04/09/20 117 lb (53.1 kg)        Code Status: Full Code     ADVANCED CARE PLANNING:  Patient has capacity for medical decisions: no  Health Care Power of : no  Living Will: no     Personal, Social, and Family History  Marital Status: single  Living situation:alone  Importance of leigh/Quaker/spiritual beliefs: [] Very [] Somewhat [] Not   Psychological Distress: mild  Does patient understand diagnosis/treatment? not asked  Does caregiver understand diagnosis/treatment?  yes      Assessment        REVIEW OF SYSTEMS  Constitutional: no fever, no chills +weight loss  Eyes: no eye pain or blurred vision  ENT: no hearing loss, congestion, or difficulty swallowing   Respiratory: no wheezing, chest tightness, +shortness of breath   Cardiovascular: no chest pain or pressure, no palpitations, no diaphoresis   Gastrointestinal: no nausea, vomiting,abdominal pain, diarrhea or constipation, no melena   Genitourinary: no dysuria, frequency,hematuria , or nocturia   Musculoskeletal: no myalgias or arthralgias, no back pain +pain- unable to describe  Skin: no rashes or sores   Neurological: no focal weakness, numbness, tingling, or headache, no seizures    PHYSICAL ASSESSMENT:  Constitutional: Groggy and oriented to person, place, and time. +Cachexia  Head: Normocephalic and atraumatic. Eyes: EOM are normal. Pupils are equal, round   Neck: Normal range of motion. Neck supple. No tracheal deviation present. Cardiovascular: ST and regular rhythm, S1, S2, no murmur +decreased BP systolic 63-04'U  Pulmonary/Chest: Effort normal and breath sounds normal. No rales or wheezes. +Bipap 93%Sp02  Abdomen: Soft. No tenderness, not distended, no ascites, no organomegaly   Musculoskeletal: Normal range of motion. No edema lower ext. Neurological: CN II-XII grossly intact, no focal neurological deficits +On light sedation- able to follow commands. Skin: Normal turgor, no bleeding, no bruising     Palliative Performance Scale:  ___60%  Ambulation reduced; Significant disease; Can't do hobbies/housework; intake normal or reduced; occasional assist; LOC full/confusion  ___50%  Mainly sit/lie; Extensive disease; Can't do any work; Considerable assist; intake normal or reduced; LOC full/confusion  ___40%  Mainly in bed; Extensive disease; Mainly assist; intake normal or reduced; LOC full/confusion   _x__30%  Bed Bound; Extensive disease; Total care; intake reduced; LOCfull/confusion  ___20%  Bed Bound; Extensive disease;  Total care; intake minimal; Drowsy/coma  ___10%  Bed Bound; Extensive disease; Total care; Mouth care only; Drowsy/coma  ___0       Death      Plan      Palliative Interaction: I received update from Zarina MOTT, and she reports speaking to patients sister who is on plane coming here. She also reports speaking with patients brother Karen Sanchez. She reports recent RAT and ICU transfer d/t significant hypoxia. Patient is now on precedec drip and Bipap. I attempted to sit down and speak with patient. I introduced myself and the palliative role to her. I asked her orientation questions and she was able to identify name, place, and date. I discussed her stage IV cancer, and latest treatment, and patient was trying to speak but unable to understand. She reports needing a drink, and off mask, but RN reports patient is not stable to do so. I explained to patient that she needs to leave mask on at this time, and offered her support. I asked patient about HCPOA and she did not seem clear about what this was. I asked if she was  or had children, and she reported one child. I asked who she would want us to look to for decision making, and asked about son but seemed unsure. I attempted to discuss code status with patient, and she shook her head to no intubation, but then attempted to talk and was unable to clearly state what she wanted to say on Bipap and became frustrated. I informed RN that with her being on Precedex and on Bipap that we will unfortunately not be able to talk to her at this time, and will reach out to her family. I reached out to [facility], and introduced myself and the palliative role. I discussed patients condition, and history. Virgil reports to be patients cousin, and reports recently wanting to take patient to Oncologist appointment to find out what is going on. I discussed family dynamics and she reports patient to be single, and has 1 biological child named Delfin.  She reports him to be somewhat estranged, but can message him through [employer] messenger to see if he will reply. Patient has 3 siblings ( Bradford Spurling from [facility], Kolton Braxton from [place], and Duane from [place])/ She reports patients mother Monique Shen) as recently getting out of hospital from heart stent, and was on vent. She reports Duane as caregiver. I offered Felissa support, and informed her that we will be following. She reports not being aware of any HCPOA/LW. I received a phone call from [place]in 62- patient son at [phone], and he reports to be patients only child. I introduced myself and the palliative role to him. I discussed if he was estranged, and he reports no bottom line is \"She will always be my mother. \" I asked the last time he had contact with her and he reports recently and reads patients text prior to hospitalization about not feeling well and not being up to visitation. He reports that he was not sure about how advanced his mothers cancer was, as she made it sound like she did not know. He reports last seeing patient in June 2020. I discussed other family relationships, and he reports to be estranged from Aunts/Uncles. I discussed HCPOA/LW, and he reports not being aware of one. I explained [address] law without one, and how he would be primary decision maker. Son asked if he could visit, and I encouraged him to call RN to discuss visitation and hours d/t his late working hours. I explained patients chronic history, and current hospitalized problems to him including stage of cancer and treatment goals. I explained her frail state, and poor condition. I also explained all three code classifications in full detail. He is aware patient is on Bipap, and may need intubation in near time. I explained CPR, defibrillation, resuscitative meds, and intubation in more detail. I asked him if he knew his mothers wishes, and he reported no. Delfin reports wanting to come up this PM to see patient, and see if he can discuss this with her.  I offered him support, and gave him the son coming to see patient tonight   Goals of care discussed with:    [] Patient independently    [x] Patient and Family    [] Family or Healthcare DPOA independently    [] Unable to discuss with patient, family/DPOA not present    3- Code Status  Full Code    4- Other recommendations   - We will continue to provide comfort and support to the patient and the family  Please call with any palliative questions or concerns. Palliative Care Team is available via [employer] or via phone. Palliative Care will continue to follow Ms. Tesfaye's care as needed. Thank you for allowing Palliative Care to participate in the care of Ms. Tesfaye . This note has been dictated by dragon, typing errors may be a possibility. The total time I spent in seeing the patient, discussing goals of care, advanced directives, code status and other major issues was more than 60 minutes      Electronically signed by   FAHAD Neal CNP  Palliative Care Team  on 12/15/2020 at 3:20 PM    Palliative Care can be reached via [employer].

## 2020-12-15 NOTE — CONSULTS
Pulmonary Medicine and Critical Care Consult  [facility] APRN-CNP/Mee Parish MD      Patient - Abhilash Velasquez   MRN -  [id number]   Acct # - [de-identified]   - 1960      Date of Admission -  2020 11:03 AM  Date of evaluation -  12/15/2020  Room - 1017/1017-   Hospital Day -  [facility], DO Primary Care Physician - Malini Pope MD     Reason for Consult    Adenocarcinoma right lung    Assessment   · Acute hypoxic respiratory failure  · Adenocarcinoma of the right lung, with brain and bone mets, s/p stereotactic radiosurgery in 2020  · Small right pleural effusion  · History of asthma/Former smoker, quit 2020  · Tachycardia  · Severe protein calorie malnutrition  · Peptic ulcer disease    Recommendations   · Oxygen via nasal cannula  · Home oxygen evaluation prior to discharge  · Incentive spirometry every hour while awake  · Continue IV antibiotics, Rocephin and Zithromax  · Albuterol and Ipratropium Q 4 hours and prn  · Symbicort  · Start Xanax 0.25 mg 3 times daily as needed for anxiety  · Consult oncology, patient follows with Dr. Chris Day  · Consult palliative care  · X-ray chest in am  · Labs: CBC and BMP in am  · Encourage oral intake, continue high calorie oral supplementation  · DVT prophylaxis with low molecular weight heparin  · Discussed with RN  · Above assessment and plan will be reviewed with Dr. Ramiro Parish. Patient plan will be finalized following review by Dr. Ramiro Parish. · Will follow with you    Problem List      Patient Active Problem List   Diagnosis    Mass of right lung    Pleural effusion, right    Tobacco dependence    Chest pain at rest    Asthma    Cancer ([address]    PUD (peptic ulcer disease)    Pneumonia    Adenocarcinoma, lung, right ([address]    Multifocal pneumonia    Sinus tachycardia    Severely underweight adult       HPI     Abhilash Velasquez is 64 y.o.,  female, admitted because of acute hypoxic respiratory failure.   She presented to acid (FOLVITE) 1 MG tablet, Take 1 mg by mouth daily  albuterol sulfate HFA (VENTOLIN HFA) 108 (90 Base) MCG/ACT inhaler, Inhale 2 puffs into the lungs every 6 hours as needed for Wheezing  fluticasone-salmeterol (ADVAIR) 250-50 MCG/DOSE AEPB, Inhale 1 puff into the lungs 2 times daily  Cholecalciferol (VITAMIN D3) 50 MCG (2000 UT) CAPS, Take 1 capsule by mouth daily  Multiple Vitamin (MULTIVITAMIN) tablet, Take 1 tablet by mouth daily (Tab-A-Robert)  pantoprazole (PROTONIX) 40 MG tablet, Take 40 mg by mouth daily    Allergies    Ibuprofen  Social History     Social History     Tobacco Use    Smoking status: Former Smoker     Packs/day: 0.50    Smokeless tobacco: Never Used    Tobacco comment: quit 3/2020   Substance Use Topics    Alcohol use: Yes     Alcohol/week: 6.0 standard drinks     Types: 6 Cans of beer per week     Comment: 2- beers a day, stopped 3/2020     Family History          Problem Relation Age of Onset    Hypertension Mother     Heart Disease Mother      ROS - 6 systems   General Denies any fever or chills  HEENT Denies any diplopia, tinnitus or vertigo  Resp positive for  dry cough and dyspnea  Cardiac Denies any chest pain, palpitations, claudication or edema  GI Denies any melena, hematochezia, hematemesis or pyrosis   Denies any frequency, urgency, hesitancy or incontinence  Heme Denies bruising or bleeding easily  Endocrine Denies any history of diabetes or thyroid disease  Neuro Denies any focal motor or sensory deficits  Psychiatric Denies anxiety, depression, suicidal ideation  Skin Denies rashes, itching, open sores    Vitals     height is 5' 1\" (1.549 m) and weight is 82 lb 6 oz (37.4 kg). Her oral temperature is 98.2 °F (36.8 °C). Her blood pressure is 89/62 and her pulse is 127. Her respiration is 16 and oxygen saturation is 91%. Body mass index is 15.56 kg/m².   I/O        Intake/Output Summary (Last 24 hours) at 12/15/2020 9719  Last data filed at 12/14/2020 8944  Gross per 24 hour   Intake 250 ml   Output --   Net 250 ml     I/O last 3 completed shifts: In: 250 [P.O.:250]  Out: -    Patient Vitals for the past 96 hrs (Last 3 readings):   Weight   12/15/20 0611 82 lb 6 oz (37.4 kg)   12/14/20 1106 80 lb (36.3 kg)     Exam   General Appearance  Awake, alert, oriented, in no acute distress  HEENT - Head is normocephalic, atraumatic. Pupil reactive to light  Neck - Supple, symmetrical, trachea midline and Soft, trachea midline and straight  Lungs - decreased air exchange on the right  Cardiovascular - Heart sounds are normal.  Tachycardic. Abdomen - Soft, nontender, nondistended, no masses or organomegaly  Neurologic - CN II-XII are grossly intact.  There are no focal motor or sensory deficits  Skin - No bruising or bleeding  Extremities - No cyanosis, clubbing or edema    Labs  - Old records and notes have been reviewed in [facility]   CBC     Lab Results   Component Value Date    WBC 4.9 12/15/2020    RBC 2.39 12/15/2020    HGB 8.3 12/15/2020    HCT 26.2 12/15/2020     12/15/2020    .6 12/15/2020    MCH 34.7 12/15/2020    MCHC 31.7 12/15/2020    RDW 22.5 12/15/2020    LYMPHOPCT 23 12/14/2020    MONOPCT 19 12/14/2020    BASOPCT 1 12/14/2020    MONOSABS 0.91 12/14/2020    LYMPHSABS 1.10 12/14/2020    EOSABS 0.00 12/14/2020    BASOSABS 0.05 12/14/2020    DIFFTYPE NOT REPORTED 12/14/2020     BMP   Lab Results   Component Value Date     12/15/2020    K 3.4 12/15/2020     12/15/2020    CO2 24 12/15/2020    BUN 14 12/15/2020    CREATININE 0.74 12/15/2020    GLUCOSE 94 12/15/2020    CALCIUM 8.4 12/15/2020    MG 1.2 12/15/2020     PTT  Lab Results   Component Value Date    APTT 31.0 04/09/2020     INR   Lab Results   Component Value Date    INR 1.1 10/30/2020    INR 1.1 04/09/2020    INR 1.1 03/12/2020    PROTIME 13.7 10/30/2020    PROTIME 11.0 04/09/2020    PROTIME 11.0 03/12/2020       Radiology    CXR       CT Scans    (See actual reports for details)    \"Thank you for asking us to see this patient\"    Case discussed with nurse and patient. Questions and concerns addressed.     Electronically signed by     Nannette Hodgkins, APRN-CNP  Pulmonary Critical Care and Sleep Medicine,  Patient seen under the supervision of Ena Councilman, MD, [facility]

## 2020-12-15 NOTE — PROGRESS NOTES
ONCOLOGY NOTE    PCP: Vincent Trotter MD  Referring Provider: No ref.  provider found    PAST MEDICAL HISTORY:      Diagnosis Date    Asthma     Cancer ([facility] [address]     lung cancer    Lung mass     PUD (peptic ulcer disease)        PAST SURGICAL HISTORY:      Procedure Laterality Date    BRONCHOSCOPY N/A 3/13/2020    BRONCHOSCOPY BRUSHINGS AND WASHING performed by Viji Coates MD at [facility]      IR PORT PLACEMENT < 5 YEARS         CURRENT MEDICATIONS:   Current Facility-Administered Medications   Medication Dose Route Frequency Provider Last Rate Last Admin    potassium chloride (KLOR-CON M) extended release tablet 40 mEq  40 mEq Oral PRN Sherill Proud, APRN - NP        Or    potassium bicarb-citric acid (EFFER-K) effervescent tablet 40 mEq  40 mEq Oral PRN Sherill Proud, APRN - NP   40 mEq at 12/15/20 6329    Or    potassium chloride 10 mEq/100 mL IVPB (Peripheral Line)  10 mEq Intravenous PRN Sherill Proud, APRN - NP        magnesium sulfate 1 g in dextrose 5% 100 mL IVPB  1 g Intravenous PRN Sherill Proud, APRN -  mL/hr at 12/15/20 1053 1 g at 12/15/20 1053    0.9 % sodium chloride infusion   Intravenous Continuous Bud P Blood, DO        0.9 % sodium chloride bolus  1,000 mL Intravenous Once Bud P Blood,  mL/hr at 12/15/20 0945 1,000 mL at 12/15/20 0945    ALPRAZolam (XANAX) tablet 0.25 mg  0.25 mg Oral TID PRN Lanette Dross, APRN - CNP   0.25 mg at 54/40/76 6916    folic acid (FOLVITE) tablet 1 mg  1 mg Oral Daily Ailyn Gray APRN - NP   1 mg at 12/15/20 8460    therapeutic multivitamin-minerals 1 tablet  1 tablet Oral Daily Ailyn Gray APRN - NP   1 tablet at 12/15/20 2559    oxyCODONE (ROXICODONE) immediate release tablet 5 mg  5 mg Oral Q8H PRN Ailyn Gray APRN - NP   5 mg at 12/15/20 0147    pantoprazole (PROTONIX) tablet 40 mg  40 mg Oral Daily Aspen Atkins APRN - NP   40 mg at 12/15/20 REPORTED Not Detected    Influenza A H1 (2009) PCR NOT REPORTED Not Detected    Influenza A H3 PCR NOT REPORTED Not Detected    Influenza B by PCR Not Detected Not Detected    Parainfluenza 1 PCR Not Detected Not Detected    Parainfluenza 2 PCR Not Detected Not Detected    Parainfluenza 3 PCR Not Detected Not Detected    Parainfluenza 4 PCR Not Detected Not Detected    Resp Syncytial Virus PCR Not Detected Not Detected    Bordetella Parapertussis Not Detected Not Detected    B Pertussis by PCR Not Detected Not Detected    Chlamydia pneumoniae By PCR Not Detected Not Detected    Mycoplasma pneumo by PCR Not Detected Not Detected   CBC Auto Differential   Result Value Ref Range    WBC 4.8 3.5 - 11.3 k/uL    RBC 3.03 (L) 3.95 - 5.11 m/uL    Hemoglobin 10.6 (L) 11.9 - 15.1 g/dL    Hematocrit 32.9 (L) 36.3 - 47.1 %    .6 (H) 82.6 - 102.9 fL    MCH 35.0 (H) 25.2 - 33.5 pg    MCHC 32.2 28.4 - 34.8 g/dL    RDW 22.5 (H) 11.8 - 14.4 %    Platelets 509 192 - 009 k/uL    MPV 11.1 8.1 - 13.5 fL    NRBC Automated 0.4 (H) 0.0 per 100 WBC    Differential Type NOT REPORTED     WBC Morphology NOT REPORTED     RBC Morphology NOT REPORTED     Platelet Estimate NOT REPORTED     Seg Neutrophils 56 36 - 65 %    Lymphocytes 23 (L) 24 - 43 %    Monocytes 19 (H) 3 - 12 %    Eosinophils % 0 (L) 1 - 4 %    Basophils 1 0 - 2 %    Immature Granulocytes 1 (H) 0 %    Segs Absolute 2.69 1.50 - 8.10 k/uL    Absolute Lymph # 1.10 1.10 - 3.70 k/uL    Absolute Mono # 0.91 0.10 - 1.20 k/uL    Absolute Eos # 0.00 0.00 - 0.44 k/uL    Basophils Absolute 0.05 0.00 - 0.20 k/uL    Absolute Immature Granulocyte 0.05 0.00 - 0.30 k/uL    Morphology ANISOCYTOSIS PRESENT    Basic Metabolic Panel   Result Value Ref Range    Glucose 108 (H) 70 - 99 mg/dL    BUN 17 6 - 20 mg/dL    CREATININE 0.65 0.50 - 0.90 mg/dL    Bun/Cre Ratio 26 (H) 9 - 20    Calcium 9.5 8.6 - 10.4 mg/dL    Sodium 138 135 - 144 mmol/L    Potassium 4.4 3.7 - 5.3 mmol/L    Chloride 100 98 - 107 mmol/L    CO2 23 20 - 31 mmol/L    Anion Gap 15 9 - 17 mmol/L    GFR Non-African American >60 >60 mL/min    GFR African American >60 >60 mL/min    GFR Comment          GFR Staging NOT REPORTED    COVID-19    Specimen: Other   Result Value Ref Range    SARS-CoV-2          SARS-CoV-2, Rapid Not Detected Not Detected    Source . NASOPHARYNGEAL SWAB     SARS-CoV-2         C-Reactive Protein   Result Value Ref Range    CRP 15.4 (H) 0.0 - 5.0 mg/L   Ferritin   Result Value Ref Range    Ferritin 3,423 (H) 13 - 150 ug/L   Lactate Dehydrogenase   Result Value Ref Range     (H) 135 - 214 U/L   Procalcitonin   Result Value Ref Range    Procalcitonin 0.13 (H) <0.09 ng/mL   Basic Metabolic Panel w/ Reflex to MG   Result Value Ref Range    Glucose 94 70 - 99 mg/dL    BUN 14 6 - 20 mg/dL    CREATININE 0.74 0.50 - 0.90 mg/dL    Bun/Cre Ratio 19 9 - 20    Calcium 8.4 (L) 8.6 - 10.4 mg/dL    Sodium 136 135 - 144 mmol/L    Potassium 3.4 (L) 3.7 - 5.3 mmol/L    Chloride 101 98 - 107 mmol/L    CO2 24 20 - 31 mmol/L    Anion Gap 11 9 - 17 mmol/L    GFR Non-African American >60 >60 mL/min    GFR African American >60 >60 mL/min    GFR Comment          GFR Staging NOT REPORTED    CBC   Result Value Ref Range    WBC 4.9 3.5 - 11.3 k/uL    RBC 2.39 (L) 3.95 - 5.11 m/uL    Hemoglobin 8.3 (L) 11.9 - 15.1 g/dL    Hematocrit 26.2 (L) 36.3 - 47.1 %    .6 (H) 82.6 - 102.9 fL    MCH 34.7 (H) 25.2 - 33.5 pg    MCHC 31.7 28.4 - 34.8 g/dL    RDW 22.5 (H) 11.8 - 14.4 %    Platelets 780 (L) 989 - 453 k/uL    MPV 11.9 8.1 - 13.5 fL    NRBC Automated 0.4 (H) 0.0 per 100 WBC   D-Dimer, Quantitative   Result Value Ref Range    D-Dimer, Quant 3.71 (H) 0.00 - 0.59 mg/L FEU   Magnesium   Result Value Ref Range    Magnesium 1.2 (L) 1.6 - 2.6 mg/dL   Iron and TIBC   Result Value Ref Range    Iron 96 37 - 145 ug/dL    TIBC 160 (L) 250 - 450 ug/dL    Iron Saturation 60 (H) 20 - 55 %    UIBC 64 (L) 112 - 347 ug/dL   EKG 12 Lead   Result Value Ref Range    Ventricular Rate 134 BPM    Atrial Rate 134 BPM    P-R Interval 126 ms    QRS Duration 68 ms    Q-T Interval 304 ms    QTc Calculation (Bazett) 453 ms    P Axis 76 degrees    R Axis 29 degrees    T Axis 55 degrees       IMPRESSION:   1. Advanced adenocarcinoma of the right lung  2. Extensive metastatic disease involving lungs, right pleura, liver, brain  3. Status post palliative systemic therapy with carboplatin/pemetrexed/pembrolizumab May 2020 through August 2020  4. Initiated systemic therapy with vinorelbine/pembrolizumab September 2020, pembrolizumab last dispensed 11/25/2020, vinorelbine last dispensed 12/2/2020  5. Acute on chronic pain syndrome, secondary to malignancy  6. Extensive pulmonary opacities, infection versus lymphangitic carcinomatosis versus immunotherapy induced pneumonitis  7. Deconditioning/debility      PLAN:     1. Met with patient at bedside for approximately 30+ minutes. Discussed current clinical scenario and test results. Reviewed CT results. Discussed management dispensed at this time and recommendations moving forward. Patient has several questions. Did my best answer these to her satisfaction. Attempted to discuss goals of care with patient. However, she is very uncomfortable with pain and shortness of breath. She is very frustrated wanting to feel better immediately. 2. Reviewed CT images. This certainly looks infectious or inflammatory. She has been on pembrolizumab since late May 2020. This may represent pneumonitis. If okay with the other services, I would recommend treating with prednisone 1 mg/kg daily for empiric treatment. This may affect her glucose levels and will require close surveillance from other services. Fact that her procalcitonin level is only minimally elevated argues against infection. 3. Would not plan any further systemic therapy during this admission.   Once she is discharged, she may follow-up with her primary oncologist to discussed plan of care moving forward  4. We will consult palliative medicine to assist with symptom management, pain control and discussion regarding goals of care. She is agreeable to palliative medicine consultation. 5. Again, I attempted to discuss the goals of care with the patient at length. She indicates that her primary goal is to just feel better and improved pain control and improve her breathing. When discussed specifically about chemotherapy, she is less certain. Specifically, she is uncertain whether or not she will accept any additional treatment. Certainly, she is hospice appropriate. 6. Otherwise, continue present management and supportive care as per other services. Call with questions.       Electronically signed by Amira Saunders MD on 12/15/2020 at 11:23 AM

## 2020-12-15 NOTE — ACP (ADVANCE CARE PLANNING)
..Advance Care Planning     Advance Care Planning (ACP) Physician/NP/PA Conversation    Date of Conversation: 12/14/2020  Conducted with: Patient with Walt 51: Next of Kin by law (only applies in absence of above) (name) Juanpablo Smith 6 Decision Maker:        Click here to complete [address] including selection of the Healthcare Decision Maker Relationship (ie \"Primary\")  Today we documented Decision Maker(s) consistent with Legal Next of Kin hierarchy. Care Preferences:    Hospitalization: \"If your health worsens and it becomes clear that your chance of recovery is unlikely, what would be your preference regarding hospitalization? \"  The patient would prefer hospitalization. Ventilation: \"If you were unable to breath on your own and your chance of recovery was unlikely, what would be your preference about the use of a ventilator (breathing machine) if it was available to you? \"  The patient is unsure. Resuscitation: \"In the event your heart stopped as a result of an underlying serious health condition, would you want attempts made to restart your heart, or would you prefer a natural death? \"  The patient is unsure. treatment goals, benefit/burden of treatment options, resuscitation preferences and hospice care    Conversation Outcomes / Follow-Up Plan:  ACP in process - information provided, considering goals and options  Reviewed DNR/DNI and patient elects Full Code (Attempt Resuscitation)- son is aware patient is a full code until we hear otherwise. Patient is on sedation, and son does not believe she has a DNR order.      Length of Voluntary ACP Conversation in minutes:  16 minutes    Cherie Coronado

## 2020-12-15 NOTE — PLAN OF CARE
Nutrition Problem #1: Inadequate protein-energy intake  Intervention: Food and/or Nutrient Delivery: Continue Current Diet, Continue Oral Nutrition Supplement  Nutritional Goals: PO intake >50% of estimated energy needs

## 2020-12-15 NOTE — PROGRESS NOTES
Comprehensive Nutrition Assessment    Type and Reason for Visit:  Initial, Positive Nutrition Screen(MAL 2)    Nutrition Recommendations/Plan: 1. Continue with current general diet and Ensure Enlive 3x/d. 2. Monitor p.o intake and tolerance. 3. May need to consider additional nutrition intervention. Nutrition Assessment:  Patient screened positive for malnutrion score 2. Pt admitted for pneumonia with SOB and FTT. Pt BMI 15.56kg/m2. Pt with 33lb (29%) weight loss x9 months. Severe loss of subcutaneous fat loss and severe muscle mass loss. PO intake 0%. Med hx: lung cancer with chemo treatment finished few weeks ago. Pt reports \"being unable to stand to get food due to being out of breath\". Pt is not eating or drinking anything. Rapid response code called. Suggest continuing with current General diet with ONS 3x/d however if pt unable to consume meals/supplement may need to consider TF order. Malnutrition Assessment:  Malnutrition Status:  Severe malnutrition    Context:  Chronic Illness     Findings of the 6 clinical characteristics of malnutrition:  Energy Intake:  7 - 75% or less estimated energy requirements for 1 month or longer  Weight Loss:  7 - Greater than 10% over 6 months     Body Fat Loss:  7 - Severe body fat loss Orbital   Muscle Mass Loss:  7 - Severe muscle mass loss Clavicles (pectoralis & deltoids)  Fluid Accumulation:  Unable to assess     Strength:  Not Performed    Estimated Daily Nutrient Needs:  Energy (kcal):  2537-4086 kcal based on 30-35 kcal/kg (lower calories for risk of refeeding syndrome); Weight Used for Energy Requirements:  Current     Protein (g):  56-64 gm protein based on 1.5-1.7 gm/kg;  Weight Used for Protein Requirements:  Current  Method Used for Fluid Requirements:  1 ml/kcal      Nutrition Related Findings:  SOB and FTT, med hx: lung CA chemo treatment few weeks ago, BiPap,  (On Prednisone)      Wounds:  None       Current Nutrition Therapies:    DIET GENERAL;  Dietary Nutrition Supplements: Standard High Calorie Oral Supplement    Anthropometric Measures:  · Height: 5' 1\" (154.9 cm)  · Current Body Weight: 82 lb 6 oz (37.4 kg)   · Admission Body Weight: 80 lb (36.3 kg)    · Usual Body Weight: 113 lb 8 oz (51.5 kg)(3/10/2020)     · Ideal Body Weight: 105 lbs;  · BMI: 15.6  · Adjusted Body Weight:  ; No Adjustment   · BMI Categories: Underweight (BMI less than 22) age over 72       Nutrition Diagnosis:   · Inadequate protein-energy intake related to catabolic illness(med hx: lung cancer) as evidenced by intake 0-25%, BMI, weight loss, severe loss of subcutaneous fat, severe muscle loss      Nutrition Interventions:   Food and/or Nutrient Delivery:  Continue Current Diet, Continue Oral Nutrition Supplement  Nutrition Education/Counseling:  Education not indicated   Coordination of Nutrition Care:  Continue to monitor while inpatient    Goals:  PO intake >50% of estimated energy needs       Nutrition Monitoring and Evaluation:   Behavioral-Environmental Outcomes:  None Identified   Food/Nutrient Intake Outcomes:  Food and Nutrient Intake, Supplement Intake  Physical Signs/Symptoms Outcomes:  Biochemical Data, Weight     Discharge Planning:    Continue Oral Nutrition Supplement, Continue current diet     Dashawn Perea, [address],   Office Number: [phone]

## 2020-12-15 NOTE — PROGRESS NOTES
Occupational Therapy   Occupational Therapy Initial Assessment  Date: 12/15/2020   Patient Name: Sukhwinder Beavers  MRN: [id number]     : 1960    PANTERA Carpio reports patient is medically stable for therapy treatment this date. Chart reviewed prior to treatment and patient is agreeable for therapy. All lines intact and patient positioned comfortably at end of treatment. All patient needs addressed prior to ending therapy session. Date of Service: 12/15/2020    Discharge Recommendations:  [address]  OT Equipment Recommendations  Equipment Needed: Yes  Mobility Devices: Izell Sarks; ADL Assistive Devices  Walker: Rolling  ADL Assistive Devices: Toileting - 3-in-1 Commode;Grab Bars - shower; Reacher;Long-handled [place];Long-handled Sponge;Emergency Alert System    Assessment   Performance deficits / Impairments: Decreased functional mobility ; Decreased safe awareness;Decreased balance;Decreased coordination;Decreased ADL status; Decreased vision/visual deficit; Decreased posture;Decreased ROM; Decreased strength;Decreased endurance;Decreased high-level IADLs;Decreased fine motor control  Assessment: Skilled OT is recommended to increase overall I and safety awareness with ADL and functional performance to return home with assist as needed. Prognosis: Fair  Decision Making: Medium Complexity  OT Education: OT Role;Plan of Care;Energy Conservation;Transfer Training  Patient Education: benefits of being up OOB, call ligt use/fall prevention, safety in function, recommendations for continued therapy services, pursed lip breathing, postural control  REQUIRES OT FOLLOW UP: Yes  Activity Tolerance  Activity Tolerance: Patient limited by fatigue(limited by resp status/O2 per NC)  Activity Tolerance: poor plus; pt fatigues easily   Safety Devices  Safety Devices in place: Yes  Type of devices: Call light within reach; Chair alarm in place; Left in chair;Patient at risk for falls;Gait belt;Nurse notified           Patient Diagnosis(es): The encounter diagnosis was Pneumonia due to organism. has a past medical history of Asthma, Cancer ([address] Lung mass, and PUD (peptic ulcer disease). has a past surgical history that includes Dilation and curettage of uterus; bronchoscopy (N/A, 3/13/2020); Hysterectomy; and IR PORT PLACEMENT < 5 YEARS. PER H&P: Geneva Blum is a 64 y.o.  female who presented to our facility today for evaluation of shortness of breath. Her symptoms have been slowly progressing over the last month. She describes symptoms of orthopnea - not being able to lie completely flat. She denies fevers or chills. No productive cough, but admits to a dry cough. She states that she cannot get up to go to the bathroom without getting extremely winded.      Restrictions  Restrictions/Precautions  Restrictions/Precautions: Fall Risk, General Precautions, Up as Tolerated  Position Activity Restriction  Other position/activity restrictions: alarms, up as brad, 2 L O2 per NC, RUE IV    Subjective   General  Chart Reviewed: Yes  Patient assessed for rehabilitation services?: Yes  Family / Caregiver Present: No  *pt did not c/o any pain during eval     Social/Functional History  Social/Functional History  Lives With: Alone  Type of Home: Apartment  Home Layout: One level  Home Access: Stairs to enter with rails(full flight to second story apt.)  Bathroom Shower/Tub: Tub/Shower unit  Bathroom Equipment: (no DME)  Home Equipment: (no DME)  Receives Help From: Family(Pt states she has a cousin who will bring her groceries and drives as needed)  ADL Assistance: Independent(pt states she was I and has been struggling recently and not able to complete ADL tasks such as bathing)  Homemaking Assistance: Independent(pt states she was I however has not been able to complete lately and her house is unkempt)  Homemaking Responsibilities: Yes  Ambulation Assistance: Independent(admits to furniture B socks seated EOB and crossing BLE's with set up/SBA with increased time.)  Toileting: Setup;Minimal assistance  Tone RUE  RUE Tone: Normotonic  Tone LUE  LUE Tone: Normotonic  Coordination  Movements Are Fluid And Coordinated: No  Coordination and Movement description: Fine motor impairments     Bed mobility  Supine to Sit: Minimal assistance;2 Person assistance  Sit to Supine: Unable to assess(Pt agreed to sit up in chair after edu on benefits of being up OOB as able)  Comment: MOD verbal instruction needed for hand placement on bed rail to assist and pursed lip breathing tech. Increased time needed. Transfers  Stand Step Transfers: Minimal assistance;2 Person assistance(with RW and bed to chair)  Sit to stand: Minimal assistance;2 Person assistance  Stand to sit: Minimal assistance;2 Person assistance  Transfer Comments: MOD verbal instruction/tactile assist for B hand placement, upright posture, weight shifting, scanning, pursed lip breathing, squaring self/AD up to surface prior to sitting and awareness/assist with lines to increase overall safety awareness. Cognition  Overall Cognitive Status: Exceptions  Arousal/Alertness: Delayed responses to stimuli  Following Commands: Follows multistep commands with increased time; Follows multistep commands with repitition  Attention Span: Appears intact  Memory: Decreased short term memory  Safety Judgement: Decreased awareness of need for safety;Decreased awareness of need for assistance  Problem Solving: Assistance required to identify errors made;Assistance required to correct errors made;Assistance required to implement solutions;Assistance required to generate solutions;Decreased awareness of errors  Insights: Decreased awareness of deficits  Initiation: Requires cues for some  Sequencing: Requires cues for some  Perception  Overall Perceptual Status: WFL     Sensation  Overall Sensation Status: WFL        LUE AROM (degrees)  LUE AROM : WFL  RUE AROM Elodia Silence, OT

## 2020-12-15 NOTE — CARE COORDINATION
Discharge planning    Patient chart reviewed. Appreciate prior ED SS initial  Assessment. Per SS patient lives at home alone [address]. Has no current dme in the home. Patient admitted with dyspnea with diagnosis of stage IV adenocarcinoma ,( dx 4/2020) pulmonary is consulted. Patient also has dx of brain mets and underwent treatment with sterotactic radiosurgery in June. MRI in September showed new evidence of metastatic disease. Patient is currently on 2 liters of 02 and will likely need to have home 02 evaluation prior to discharge. Patient admitted in march and had thoracentesis done at that time. PT , OT pending. Will follow for home 02 , potential home care and palliative has been consulted.

## 2020-12-15 NOTE — CONSULTS
[facility] Cardiology   Consult Note             Date:   12/15/2020  Patient name: Geneva Blum  Date of admission:  12/14/2020 11:03 AM  MRN:   [id number]  YOB: 1960    Reason for Admission: Tachycardia  Acute hypoxic respiratory failure      HISTORY OF PRESENT ILLNESS:    Geneva Blum is a 64 y.o. female who presents to the emergency department for shortness of breath. This is an ongoing issue for many months. She has lung cancer. She has not had a fever or significant productive cough. She does not complain of any pain. She states she gets quite short of breath when she walks in her home and she feels like she needs oxygen at home  When she was in the emergency room she was found to be markedly tachycardic with a heart rate in the 150s. She had an EKG which shows sinus tachycardia. Cardiology was then consulted to help manage tachycardia. I recommended IV fluids to be given and after receiving a liter of IV fluids her heart rate is improved. Past Medical History:   has a past medical history of Asthma, Cancer ([address] Lung mass, and PUD (peptic ulcer disease). Past Surgical History:   has a past surgical history that includes Dilation and curettage of uterus; bronchoscopy (N/A, 3/13/2020); Hysterectomy; and IR PORT PLACEMENT < 5 YEARS. Home Medications:    Prior to Admission medications    Medication Sig Start Date End Date Taking? Authorizing Provider   oxyCODONE (ROXICODONE) 5 MG immediate release tablet Take 5 mg by mouth every 8 hours as needed.  11/17/20   Historical Provider, MD   folic acid (FOLVITE) 1 MG tablet Take 1 mg by mouth daily    Historical Provider, MD   albuterol sulfate HFA (VENTOLIN HFA) 108 (90 Base) MCG/ACT inhaler Inhale 2 puffs into the lungs every 6 hours as needed for Wheezing 3/13/20   Bud GARDNER Blood, DO   fluticasone-salmeterol (ADVAIR) 250-50 MCG/DOSE AEPB Inhale 1 puff into the lungs 2 times daily 3/13/20   Tez GARDNER Blood, DO   Cholecalciferol (VITAMIN D3) 50 MCG (2000 UT) CAPS Take 1 capsule by mouth daily    Historical Provider, MD   Multiple Vitamin (MULTIVITAMIN) tablet Take 1 tablet by mouth daily (Tab-A-Robert)    Historical Provider, MD   pantoprazole (PROTONIX) 40 MG tablet Take 40 mg by mouth daily    Historical Provider, MD       Allergies:  Ibuprofen    Social History:   reports that she has quit smoking. She smoked 0.50 packs per day. She has never used smokeless tobacco. She reports current alcohol use of about 6.0 standard drinks of alcohol per week. She reports current drug use. Family History: family history includes Heart Disease in her mother; Hypertension in her mother. REVIEW OF SYSTEMS:    · . PHYSICAL EXAM:    Physical Examination:    BP 89/62   Pulse 127   Temp 98.2 °F (36.8 °C) (Oral)   Resp 16   Ht 5' 1\" (1.549 m)   Wt 82 lb 6 oz (37.4 kg)   SpO2 91%   BMI 15.56 kg/m²    Constitutional and General Appearance: alert, cooperative and cachectic  HEENT: PERRL, no cervical lymphadenopathy. No masses palpable. Normal oral mucosa  Respiratory:  · Normal excursion and expansion without use of accessory muscles  · Resp Auscultation: Good respiratory effort. Yes for increased work of breathing. On auscultation: decreased breath sounds bilaterally  Cardiovascular:  · The apical impulse is not displaced  · Heart tones are crisp and normal. regular S1 and S2.  · Jugular venous pulsation Normal  · The carotid upstroke is normal in amplitude and contour without delay or bruit  · Peripheral pulses are symmetrical and full   Abdomen:  · No masses or tenderness  · Bowel sounds present  Extremities:  ·  No Cyanosis or Clubbing  ·  Lower extremity edema: No  ·  Skin: Warm and dry  Neurological:  · Alert and oriented.   · Moves all extremities well  · No abnormalities of mood, affect, memory, mentation, or behavior are noted    DATA:    Diagnostics:      EKG: sinus tachycardia    Labs:     CBC:   Recent Labs     12/14/20  1134 12/15/20  0442   WBC 4.8 4.9   HGB 10.6* 8.3*   HCT 32.9* 26.2*    125*     BMP:   Recent Labs     12/14/20  1134 12/15/20  0442    136   K 4.4 3.4*   CO2 23 24   BUN 17 14   CREATININE 0.65 0.74   LABGLOM >60 >60   GLUCOSE 108* 94     BNP: No results for input(s): BNP in the last 72 hours. PT/INR: No results for input(s): PROTIME, INR in the last 72 hours. APTT:No results for input(s): APTT in the last 72 hours. CARDIAC ENZYMES:No results for input(s): CKTOTAL, CKMB, CKMBINDEX, TROPONINI in the last 72 hours. FASTING LIPID PANEL:  Lab Results   Component Value Date    HDL 47 03/11/2020    TRIG 98 03/11/2020     LIVER PROFILE:No results for input(s): AST, ALT, LABALBU in the last 72 hours. IMPRESSION:    Patient Active Problem List   Diagnosis    Mass of right lung    Pleural effusion, right    Tobacco dependence    Chest pain at rest    Asthma    [facility] [address]    PUD (peptic ulcer disease)    Pneumonia    Adenocarcinoma, lung, right ([address]    Multifocal pneumonia    Sinus tachycardia    Severely underweight adult       RECOMMENDATIONS:  1. Tachycardia  2. Lung cancer  3. Respiratory failure  Plan  Tachycardia has improved with conservative therapy. No evidence of primary cardiac arrhythmia. Await echocardiogram results. Further management will depend on echocardiogram results. Discussed with patient and nursing.     Frederic Jaimes MD  [facility] cardiology

## 2020-12-15 NOTE — PROGRESS NOTES
Patient's resting HR has been in the 130s-140s. Patient has been resting in bed. No distress noted. On-call NP notified. No new orders received.

## 2020-12-15 NOTE — PLAN OF CARE
[facility]  Office: [address], DO, Fernanda Johnston, DO, Rusty Patee, DO, Smiley Spire Blood, DO, Jean Carlos German MD, Noemí Naik MD, Bhaskar Jenkins MD, Savanna Oliveros MD, Bayron Duarte MD, Misha Goodwin MD, Camila Vann MD, Mayito Ambriz MD, Ellis Sr MD, Temitope Doyle, DO, Jocelin Poole MD, Katrina Bautista MD, Elsa Scarce, DO, Dat Rice MD,  Lachelle Rota, DO, Javid Do MD, Rosangela Turner MD, Garrick Caro, CNP, [facility] Pride, CNP, García Soliman, CNP, Sandy Young, CNS, Anamika Ogden, CNP, Anjelica Blum, CNP, Rashad Grant, CNP, Heddy Sacks, CNP, Anastasia Daugherty, CNP, Rina Canada PA-C, Zeeshan Hernandez, DNP, Lee Carbon, CNP, Mirtha Alvarenga, CNP, Thee Warneros, CNP, Cam Dural, CNP, Peter Comfort, CNP         [facility]    Second Visit Note  For more detailed information please refer to the progress note of the day      12/15/2020    1:36 PM    Name:   Luann Mullen  MRN:     [id number]     Acct:      [de-identified]   Room:   [address] [place] [address] Day:  1  Admit Date:  12/14/2020 11:03 AM    PCP:   Lizeth Oden MD  Code Status:  Full Code      Pt vitals were reviewed   New labs were reviewed   Patient was seen    Updated plan :     1. Responded to rapid response: found with sats in 45s; had received percocet and xanax-Henri Hurst already there and ordered abg, cxr and we are transferring to icu.   May need bipap        Bud GARDNER Blood, DO  12/15/2020  1:36 PM

## 2020-12-15 NOTE — PROGRESS NOTES
[facility]  Office: [address], DO, Leila Zamorano, DO, Ag Devlin, DO, Claudia Malik Blood, DO, Haydee Ku MD, Leandro Duron MD, Apurva Chilel MD, Juan Moya MD, Alfredo Pham MD, Beryle Marseille, MD, Ciro Pallas, MD, Jeff Mc MD, Ellis Rivas MD, Magan Giron, DO, Shea Aviles MD, Melissa Garcia MD, Altagracia Abbott, DO, Cricket Shore MD,  Brian Nichole DO, Juan Miguel Richards MD, Lakeshia Alfaro MD, Selma Johnson, [facility], [facility], [facility], Chandler Parks CNP, Walter Chand, CNS, Sammy Vargas, CNP, Lilian Turcios, CNP, Tammy Vega, CNP, Ryne Cavanaugh, CNP, Soham Leija, CNP, Stacey Yancey PA-C, Mortimer Guardian, [facility], Taz Bush, CNP, Lavonne Frankel, CNP, Lara Contreras, CNP, Rigoberto Jeter, CNP, Vanessa Mckeon, [address]    Progress Note    12/15/2020    9:13 AM    Name:   Maurisio Schwartz  MRN:     [id number]     Acct:      [de-identified]   Room:   [address] [place] [address] Day:  1  Admit Date:  12/14/2020 11:03 AM    PCP:   Rachel Rachel MD  Code Status:  Full Code    Subjective:     C/C:   Chief Complaint   Patient presents with    Shortness of Breath     Interval History Status: not changed. Feels poorly  Denies cp/n/v  bp running lower    Brief History:     Per my partner:  Adrian Gandara is a 64 y.o.  female who presented to our facility today for evaluation of shortness of breath. Her symptoms have been slowly progressing over the last month. She describes symptoms of orthopnea - not being able to lie completely flat. She denies fevers or chills. No productive cough, but admits to a dry cough.  She states that she cannot get up to go to the bathroom without getting extremely winded.     Her comorbidities include known stage IV adenocarcinoma (diagnosed in April of 2020) (see below onc history) ([id number]) of the right lung with a brain metastases to her right lobe of the cerebellum s/p sterotactic radiosurgery (6/2020). She recently attempted to undergo a thoracentesis of the right lung for fluid removal, however, the procedure was too painful and was aborted. MRI in September revealed resolution of the right cerebellar ring-enhancing lesion with now new evidence of metastatic disease noted. \"    Review of Systems:     Constitutional:  negative for chills, fevers, sweats  Respiratory:  negative for wheezing  Cardiovascular:  negative for chest pain, chest pressure/discomfort, lower extremity edema, palpitations  Gastrointestinal:  negative for abdominal pain, constipation, diarrhea, nausea, vomiting  Neurological:  negative for dizziness, headache    Medications: Allergies: Allergies   Allergen Reactions    Ibuprofen Hives     Other reaction(s): Unknown       Current Meds:   Scheduled Meds:    folic acid  1 mg Oral Daily    therapeutic multivitamin-minerals  1 tablet Oral Daily    pantoprazole  40 mg Oral Daily    budesonide-formoterol  2 puff Inhalation BID    Vitamin D  2,000 Units Oral Daily    sodium chloride flush  10 mL Intravenous 2 times per day    ipratropium-albuterol  1 ampule Inhalation Q4H WA    enoxaparin  1 mg/kg Subcutaneous BID    cefTRIAXone (ROCEPHIN) IV  1 g Intravenous Q24H    azithromycin  500 mg Intravenous Q24H     Continuous Infusions:   PRN Meds: potassium chloride **OR** potassium alternative oral replacement **OR** potassium chloride, magnesium sulfate, oxyCODONE, sodium chloride flush, nicotine, promethazine **OR** ondansetron, magnesium hydroxide, acetaminophen **OR** acetaminophen, albuterol, sodium chloride flush    Data:     Past Medical History:   has a past medical history of Asthma, Cancer ([address] Lung mass, and PUD (peptic ulcer disease). Social History:   reports that she has quit smoking. She smoked 0.50 packs per day. She has never used smokeless tobacco. She reports current alcohol use of about 6.0 standard drinks of alcohol per week. She reports current drug use. Family History:   Family History   Problem Relation Age of Onset    Hypertension Mother     Heart Disease Mother        Vitals:  BP 89/62   Pulse 127   Temp 98.2 °F (36.8 °C) (Oral)   Resp 16   Ht 5' 1\" (1.549 m)   Wt 82 lb 6 oz (37.4 kg)   SpO2 91%   BMI 15.56 kg/m²   Temp (24hrs), Av.3 °F (36.8 °C), Min:98.1 °F (36.7 °C), Max:98.5 °F (36.9 °C)    No results for input(s): POCGLU in the last 72 hours. I/O (24Hr): Intake/Output Summary (Last 24 hours) at 12/15/2020 09  Last data filed at 2020 1837  Gross per 24 hour   Intake 250 ml   Output --   Net 250 ml       Labs:  Hematology:  Recent Labs     20  1134 12/14/20  2050 12/15/20  0442   WBC 4.8  --  4.9   RBC 3.03*  --  2.39*   HGB 10.6*  --  8.3*   HCT 32.9*  --  26.2*   .6*  --  109.6*   MCH 35.0*  --  34.7*   MCHC 32.2  --  31.7   RDW 22.5*  --  22.5*     --  125*   MPV 11.1  --  11.9   CRP 15.4*  --   --    DDIMER  --  3.71*  --      Chemistry:  Recent Labs     20  1134 12/15/20  0442    136   K 4.4 3.4*    101   CO2 23 24   GLUCOSE 108* 94   BUN 17 14   CREATININE 0.65 0.74   MG  --  1.2*   ANIONGAP 15 11   LABGLOM >60 >60   GFRAA >60 >60   CALCIUM 9.5 8.4*     Recent Labs     20  113   *     ABG:No results found for: POCPH, PHART, PH, POCPCO2, DOB6SMJ, PCO2, POCPO2, PO2ART, PO2, POCHCO3, SCK0YFK, HCO3, NBEA, PBEA, BEART, BE, THGBART, THB, TFA8RUI, TZQD9OUO, A3PNOQFQ, O2SAT, FIO2  Lab Results   Component Value Date/Time    SPECIAL LH, 10ML, RB 2020 12:40 PM    SPECIAL RIGHT AC, 10ML, KM 2020 12:40 PM     Lab Results   Component Value Date/Time    CULTURE NO GROWTH 8 HOURS 2020 12:40 PM    CULTURE NO GROWTH 8 HOURS 2020 12:40 PM       Radiology:  Xr Chest Portable    Result Date: 2020  Stable right-sided cardiopulmonary findings.  New airspace disease identified throughout the left hemithorax, concerning for multifocal Blood, DO  12/15/2020  9:13 AM

## 2020-12-15 NOTE — PROGRESS NOTES
After talking with Dr. Angelita Bravo received orders to consult cardiology. Dr. Keon Ricci left it up to primary's discretion on who to consult. On-call NP was notified and said to consult Dr. Sean Steiner. Dr. Ligia Myrick is on call. He was consulted about patient's tachycardia and hypotension. No new orders were received.

## 2020-12-15 NOTE — PROGRESS NOTES
RN spoke with Dr. Elisha Vázquez updated on patient would like to get picc if access rn could get one.

## 2020-12-16 NOTE — PROGRESS NOTES
up. She is on bipap and tachypnic. She is anxious when she is awake. She asks me, \"am I going to die? \" I explained to her that she is nearing the end of her disease. Pt and son become tearful and patient states she needs to talk to her son. She proceeds to tell him to take her purse home, started to discuss money and possessions. After, I made several attempts to ask the patient if she wishes to continue aggressive treatment or would she prefer we just focus on keeping her comfortable. Pt will not answer me. I attempted several different ways to ask her her wishes without success. Will have our NP follow up today and continue conversation. Pt is too anxious and does not want to talk with me. She continues on precedex gtt. Goals/Plan of care  Education/support to family  Education/support to patient  Discharge planning/helping to coordinate care  Communications with primary service  Providing support for coping/adaptation/distress of family  Providing support for coping/adaptation/distress of patient  Continue with current plan of care  Clarification of medical condition to patient and family  Code status clarified: Full Code  Provided information about hospice  Validating patient/family distress  Continued communication updates  Recognizing, reflecting, and empathizing with family members' anticipatory grief  Recognizing, reflecting, and empathizing with the patient's anticipatory grief  Family meeting with IM NP, myself and patient's son. Son given patient's poor prognosis. Attempted to discuss comforrt care with patient but she will not answer my questions. Son states patient has told him she wants comfort care. WIll attempt again later today.       Palliative Care Coordinator  [facility] PANTERA Smith, COTY GUTIÉRREZ [facility] Office: [address] Office: [phone]    For Symptom Management Clinic scheduling please call [phone]

## 2020-12-16 NOTE — PROGRESS NOTES
Physical Therapy  DATE: 2020    NAME: Victoria Sharma  MRN: [id number]   : 1960    Patient not seen this date for Physical Therapy due to:  [] Blood transfusion in progress  [] Hemodialysis  [] Patient Declined  [] Spine Precautions   [] Strict Bedrest  [] Surgery/ Procedure  [] Testing      [x] Other  RN Cx; pt. In resp. Distress on Bipap      [] PT is being discontinued at this time. Patient independent. No further needs. [] PT is being discontinued at this time due to declining physical/ medical status. Therapy is not appropriate at this time.     Alicia Spring, PT

## 2020-12-16 NOTE — PROGRESS NOTES
Family has decided on Comfort care. Pt was changed [facility]. Pt was given morphine and ativan and removed from bipap. Comfort measures implemented. Pt's sister Sonali Macias, [id number] and son Veronica Mcdonald at bedside. Emotional support offered.  visited and had prayer with patient and family. Nataly Collier [address], Sarah RN, [address] [facility]

## 2020-12-16 NOTE — CARE COORDINATION
Patient's son is standing outside of patient's room. Spoke with him and offered him to go into waiting room, as the patient did not want son in her room. Santa served Dr. Saunders to come up to discuss plan of care with the son. Marylene Spice from palliative had a lengthy  with son and patient. Discuss prognosis, code status and probable hospice. Continue to follow.

## 2020-12-16 NOTE — PROGRESS NOTES
Occupational Therapy    DATE: 2020    NAME: Birgit Wong  MRN: [id number]   : 1960      [facility]  Occupational Therapy Not Seen Note    Patient not available for Occupational Therapy due to:    [] Testing:    [] Hemodialysis    [x] Cancelled by RN:    []Refusal by Patient:    [] Surgery:     [] Intubation:     [] Pain Medication:    [] Sedation:     [] Spine Precautions :    [x] Medical Instability: Respiratory distress, on bi pap    [] Other:    Viviane Plane, MARQUEZ/L

## 2020-12-16 NOTE — FLOWSHEET NOTE
Patient's RN states that patient has requested that all life support be removed. Patient is on BiPap and patient's sister Jackie Welch) and brother-in-law Linda Crane) are bedside. Patient and family request a prayer. Writer offers a prayer for peace and strength. Patient and family express appreciation. Spiritual care will follow as needed. 12/16/20 1715   Encounter Summary   Services provided to: Patient and family together   Referral/Consult From: Nurse   Support System Family members   Continue Visiting   (12/16/20)   Complexity of Encounter Moderate   Length of Encounter 15 minutes   Routine   Type Follow up   Grief and Life Adjustment   Type End of life   Assessment Approachable; Anxious   Intervention Active listening;Explored feelings, thoughts, concerns;Explored coping resources;Nurtured hope;Prayer   Outcome Expressed gratitude

## 2020-12-16 NOTE — PROGRESS NOTES
Pulmonary Critical Care Progress Note  Janell Pina CNP / Dr. Bao Marion M.D. Patient seen for the follow up of Acute on chronic respiratory failure with hypoxia and hypercapnia (HCC)     Subjective:  She is resting in bed, RN at bedside. She is currently requiring BiPAP with FiO2 at 80%. She is tachypneic, in respiratory distress. She had a long meeting with her son, palliative care and medicine team earlier today and has agreed for hospice consult with hopes to be discharged to inpatient hospice later today. She has changed her CODE STATUS to DNR CC/comfort care. Length of stay: 2 Days    Examination:    Vitals: /82   Pulse 100   Temp 98 °F (36.7 °C) (Temporal)   Resp (!) 39   Ht 5' 1\" (1.549 m)   Wt 81 lb (36.7 kg)   SpO2 92%   BMI 15.30 kg/m²     General appearance: alert, appears acutely ill/respiratory distress  Neck: No JVD  Lungs: Scattered rhonchi, no wheeze, tachypneic, increased work of breathing  Heart: Tachycardic  Abdomen: Soft, non tender, + BS  Extremities: no cyanosis or clubbing.  No significant edema    LABs:  CBC:   Recent Labs     12/15/20  0442 12/16/20  0629   WBC 4.9 9.7   HGB 8.3* 9.2*   HCT 26.2* 28.6*   * 115*     BMP:   Recent Labs     12/15/20  1516 12/16/20  0629    142   K 3.9 4.3   CO2 24 29   BUN 14 14   CREATININE 0.81 0.49*   LABGLOM >60 >60   GLUCOSE 273* 135*     Radiology:      Impression:  · Acute hypoxic respiratory failure  · Adenocarcinoma of the right lung, with brain and bone mets, s/p stereotactic radiosurgery in June 2020  · Small right pleural effusion  · Tachycardia  · History of asthma/Former smoker, quit March 2020  · Severe protein calorie malnutrition  · Peptic ulcer disease    Recommendations:  · Oxygen by nasal cannula patches and saturation greater than 92%  · Currently requiring BiPAP at 80%  · Precedex gtt  · Levophed gtt  · Continue IV Zithromax  · Change IV Rocephin to IV Zosyn  · Albuterol and Ipratropium Q 4 hours and prn  · Symbicort 160  · IV solu-medrol 40 mg every 6 hours  · Xanax p.o. 3 times daily as needed  · Encourage oral intake  · Palliative care on consult  · Hospice consult, plan for discharging with inpatient hospice   · DVT prophylaxis with low molecular weight heparin  · Incentive spirometry every hour while awake  · Will follow with you    Electronically signed by     FAHAD Phan CNP on 12/16/2020 at 12:51 PM  Patient was seen under the supervision of Dr. Abena Ramon  Above assessment and plan will be reviewed with Dr. Valerie Martinez.   Patient plan will bee finalized following review by Dr. Sade Coe and Sleep Medicine,    [facility] [place]: [phone]

## 2020-12-16 NOTE — PROGRESS NOTES
..    Palliative Care Progress Note    NAME:  General Handing  MEDICAL RECORD NUMBER:  [id number]  AGE: 64 y.o. GENDER: female  : 1960  TODAY'S DATE:  2020    Reason for Consult:  symptom management, goals of care, hospice discussion and support  History of Present Illness     The patient is a 64 y.o. Non-/non  female who presents with Shortness of Breath      Referred to Palliative Care by  [x] Physician   [] Nursing  [] Family Request   [] Other:       She was admitted to the primary service for Pneumonia [J18.9]. Her hospital course has been associated with Acute on chronic respiratory failure with hypoxia and hypercapnia ([address] pneumonia, ST, Acute on chronic pain syndrome, severe malnutrition, small right pleural effusion, and adenocarcinoma of right lung with metastatic disease. The patient has a complicated medical history and has been hospitalized since 2020 11:03 AM. I was on CVTU and Henri NP reports patients code status has changed since family meeting, and patient is hospice care. I asked if writer should place comfort medications at this time, and he said yes. I went to see patient and she is tachypneic on 90% Bipap with Sp02 in mid 80's. Patient sister at bedside, and son is on his way. Patient is expressing wanting off Bipap. She reports needing comfort medications at this time. Palliative care following for symptom management, and support. OVERNIGHT EVENTS: Patient is on continuous Bipap, and has decided with family to make herself a [facility] and to focus on comfort.         /79   Pulse 103   Temp 97.3 °F (36.3 °C) (Temporal)   Resp (!) 40   Ht 5' 1\" (1.549 m)   Wt 81 lb (36.7 kg)   SpO2 (!) 83%   BMI 15.30 kg/m²     Assessment        REVIEW OF SYSTEMS    []   UNABLE TO OBTAIN:     Constitutional:  []   Chills   [x]  Fatigue   []  Fevers   []  Malaise   []  Weight loss   [] Other:     Respiratory:   []  Cough    [x]  Shortness of breath    []  Chest pain minimal; Drowsy/coma  ___10%  Bed Bound; Extensive disease; Total care; Mouth care only; Drowsy/coma  ___0       Death      Plan      Palliative Interaction: I was on CVTU and RN and Henri BROWN reports patient is comfort care/hospice care, and wanting to remove Bipap at this time. RN reports patient is struggling, and Sp02 is mid [de-identified]. I went to see patient, and she was tachycardic, and tachypneic on Bipap. Her Sp02 was in mid 80's. Patient is agitated and wants Bipap off. I introduced myself to patient and patients sister/SRIDHAR at bedside. They reports son is on his way here, and was hoping patient would wait for him but understands that patient is suffering. She reports, \"just let me go and die. \" I checked patients orientation level, and she is A/O x4. She reports wanting to focus on comfort, and family at bedside reports all agreeable. I reviewed chart, and placed comfort meds. RN to asked IM about shutting off Pressor now.  in to visit and prayer was said around patients bed at this time. Rashmi Retana RN joined and provided support to patient and family with writer. I wiped patients face per request and dried. I placed comfort blanket on her bed, and informed family. Patients sister tearful at this time, and continued support offered. RN gave patient comfort meds, and patient breathing was more relaxed and she was able to come of Bipap. Family asked for privacy at this time, writer respected. Writer checked in, and Sp02 decreased to 50% off Bipap and patients eyes closed. Family tearful, and are aware that patient may pass soon. Son arrived and Rashmi Retana RN took him to bedside.      Education/support to staff  Education/support to family  Education/support to patient  Communications with primary service  Providing support for coping/adaptation/distress of family  Providing support for coping/adaptation/distress of patient  Discussing meaning/purpose   Specific spiritual beliefs/practices  Decisional capacity assessed  Continue with current plan of care  Clarification of medical condition to patient and family  Code status clarified: [address]  Pain management for comfort  Medications to decrease non-pain symptoms  Validating patient/family distress  Continued communication updates  Recognizing, reflecting, and empathizing with family members' anticipatory grief  Withdrawal of life support intervention Pressor support being shut off  Regis Lauren NP and John RN from palliative has family meeting with patient and patients family earlier, and it was later decided that patient wanted to focus on comfort vs treatment. Principle Problem/Diagnosis:  Acute on chronic respiratory failure with hypoxia and hypercapnia (HCC)    Additional Assessments:  Principal Problem:    Acute on chronic respiratory failure with hypoxia and hypercapnia (HCC)  Active Problems:    Adenocarcinoma, lung, right (HCC)    Pneumonia due to organism    Sinus tachycardia    Severely underweight adult    Severe malnutrition ([facility]    Palliative care encounter    Goals of care, counseling/discussion    DNR (do not resuscitate) discussion    Multifocal pneumonia  Resolved Problems:    * No resolved hospital problems. *    1- Symptom management/ pain control     Pain Assessment:  Pain is not well controlled. Medication(s) being used: none. Anxiety:  difficulty concentrating, fatigue, irritable, racing thoughts, shortness of breath                          Dyspnea:  acute dyspnea                          Fatigue:  generalized weakness    Other: We feel the patient symptoms are not being controlled. her current regimen is reviewed by myself and discussed with the staff. Comfort medications were added, as patient is ST, and tachypneic with Sp02 in mid 80's. Family reports patient wanting Bipap off.      2- Goals of care evaluation   The patient goals of care are provide comfort care/support/palliation/relieve suffering, achievement of a peaceful

## 2020-12-16 NOTE — PROGRESS NOTES
ONCOLOGY NOTE    PCP: Blessing Santillan MD  Referring Provider: No ref.  provider found    PAST MEDICAL HISTORY:      Diagnosis Date    Asthma     Cancer ([address]     lung cancer    Lung mass     PUD (peptic ulcer disease)        PAST SURGICAL HISTORY:      Procedure Laterality Date    BRONCHOSCOPY N/A 3/13/2020    BRONCHOSCOPY BRUSHINGS AND WASHING performed by Maninder Chase MD at [address]      IR PORT PLACEMENT < 5 YEARS         CURRENT MEDICATIONS:   Current Facility-Administered Medications   Medication Dose Route Frequency Provider Last Rate Last Admin    methylPREDNISolone sodium (SOLU-MEDROL) injection 40 mg  40 mg Intravenous Q6H Bud P Blood, DO   40 mg at 12/16/20 0929    potassium chloride (KLOR-CON M) extended release tablet 40 mEq  40 mEq Oral PRN Elke Earlkrat, APRN - NP        Or    potassium bicarb-citric acid (EFFER-K) effervescent tablet 40 mEq  40 mEq Oral PRN Daviera Madykrat, APRN - NP   40 mEq at 12/15/20 0782    Or    potassium chloride 10 mEq/100 mL IVPB (Peripheral Line)  10 mEq Intravenous PRN Elke Murrayat, APRN - NP        magnesium sulfate 1 g in dextrose 5% 100 mL IVPB  1 g Intravenous PRN Elke Murrayat, APRN - NP   Stopped at 12/15/20 1450    0.9 % sodium chloride infusion   Intravenous Continuous Bud P Blood,  mL/hr at 12/16/20 0916 New Bag at 12/16/20 0916    ALPRAZolam (XANAX) tablet 0.25 mg  0.25 mg Oral TID PRN Mago Quiroga, APRN - CNP   0.25 mg at 12/15/20 1052    insulin lispro (HUMALOG) injection vial 0-12 Units  0-12 Units Subcutaneous TID WC Elke Fajardo, APRN - NP   4 Units at 12/15/20 1654    insulin lispro (HUMALOG) injection vial 0-6 Units  0-6 Units Subcutaneous Nightly Elke Murrayat, APRN - NP   1 Units at 12/15/20 2148    glucose (GLUTOSE) 40 % oral gel 15 g  15 g Oral PRN Elke Murrayat, APRN - NP        dextrose 50 % IV solution  12.5 g Intravenous PRN Elke Murrayat, APRN - NP        glucagon (rDNA) injection 1 mg  1 mg Intramuscular PRN FAHAD Sweet - NP        dextrose 5 % solution  100 mL/hr Intravenous PRN FAHAD Sweet - NP        heparin (porcine) injection 5,000 Units  5,000 Units Subcutaneous BID FAHAD Sweet - NP   5,000 Units at 12/16/20 0855    dexmedetomidine (PRECEDEX) 400 mcg in sodium chloride 0.9 % 100 mL infusion  0.2 mcg/kg/hr Intravenous Continuous FAHAD Sweet NP 7.5 mL/hr at 12/16/20 0900 0.8 mcg/kg/hr at 12/16/20 0900    metoprolol (LOPRESSOR) injection 5 mg  5 mg Intravenous Q4H PRN FAHAD Sweet - AXEL        norepinephrine (LEVOPHED) 16 mg in dextrose 5 % 250 mL infusion  2 mcg/min Intravenous Continuous Mami Garcia MD 2.8 mL/hr at 12/16/20 0604 3 mcg/min at 06/88/19 7822    folic acid (FOLVITE) tablet 1 mg  1 mg Oral Daily FAHAD Simon - NP   1 mg at 12/15/20 1005    therapeutic multivitamin-minerals 1 tablet  1 tablet Oral Daily Aspen Atkins APRN - NP   1 tablet at 12/16/20 0855    oxyCODONE (ROXICODONE) immediate release tablet 5 mg  5 mg Oral Q8H PRN Aaron Lora APRN - NP   5 mg at 12/15/20 1155    pantoprazole (PROTONIX) tablet 40 mg  40 mg Oral Daily Aspen Atkins APRN - NP   40 mg at 12/15/20 0842    budesonide-formoterol (SYMBICORT) 160-4.5 MCG/ACT inhaler 2 puff  2 puff Inhalation BID Aaron Lora APRN - NP   2 puff at 12/16/20 0817    Vitamin D (CHOLECALCIFEROL) tablet 2,000 Units  2,000 Units Oral Daily Aspen Atkins, APRN - NP   2,000 Units at 12/15/20 0842    sodium chloride flush 0.9 % injection 10 mL  10 mL Intravenous 2 times per day Aaron Lora APRN - NP   10 mL at 12/16/20 0852    sodium chloride flush 0.9 % injection 10 mL  10 mL Intravenous PRN FAHAD Pino NP   10 mL at 12/14/20 2141    nicotine (NICODERM CQ) 21 MG/24HR 1 patch  1 patch Transdermal Daily PRN FAHAD Pino NP        promethazine (PHENERGAN) tablet 12.5 mg 12.5 mg Oral Q6H PRN Aspen Milly, APRN - NP        Or    ondansetron (ZOFRAN) injection 4 mg  4 mg Intravenous Q6H PRN Aspen Milly, APRN - NP        magnesium hydroxide (MILK OF MAGNESIA) 400 MG/5ML suspension 30 mL  30 mL Oral Daily PRN Aspen Milly, APRN - NP        acetaminophen (TYLENOL) tablet 650 mg  650 mg Oral Q6H PRN Aspen Milly, APRN - NP        Or    acetaminophen (TYLENOL) suppository 650 mg  650 mg Rectal Q6H PRN Aspen Milly, APRN - NP        albuterol (PROVENTIL) nebulizer solution 2.5 mg  2.5 mg Nebulization Q2H PRN Aspen Milly, APRN - NP        ipratropium-albuterol (DUONEB) nebulizer solution 1 ampule  1 ampule Inhalation Q4H WA Aspen Milly, APRN - NP   1 ampule at 12/16/20 1144    cefTRIAXone (ROCEPHIN) 1 g IVPB in 50 mL D5W minibag  1 g Intravenous Q24H Dellia Polo Hauger, DO   Stopped at 12/15/20 2215    azithromycin (ZITHROMAX) 500 mg in D5W 250ml addavial  500 mg Intravenous Q24H Dellia Polo Hauger, DO   Stopped at 12/15/20 2350    sodium chloride flush 0.9 % injection 10 mL  10 mL Intravenous PRN Dellia Polo Hauger, DO           VISIT DIAGNOSIS:  The encounter diagnosis was Pneumonia due to organism. STAGING:  Cancer Staging  No matching staging information was found for the patient.     Patient Active Problem List    Diagnosis Date Noted    Adenocarcinoma, lung, right ([facility] 12/14/2020     Priority: High    Pneumonia due to organism 12/14/2020     Priority: High     Likely bacterial      Sinus tachycardia 12/14/2020     Priority: High    Severely underweight adult 12/14/2020     Priority: High    Acute on chronic respiratory failure with hypoxia and hypercapnia (HCC) 12/15/2020    Severe malnutrition ([facility] 12/15/2020    Palliative care encounter     Goals of care, counseling/discussion     DNR (do not resuscitate) discussion     Pneumonia 12/14/2020    Asthma     Cancer ([facility]      lung cancer      PUD (peptic ulcer disease)     Pleural effusion, right     Tobacco dependence     Chest pain at rest     Mass of right lung        SUBJECTIVE:  Nerissa Estrada is a very pleasant 64 y.o. female who is admitted to [facility] for worsening shortness of breath in the setting of metastatic lung cancer. Oncology consultation was requested given her known diagnosis of malignancy and established relationship with my partner, Dr. Yas Sadler. Chart reviewed. Events of yesterday noted. Patient had worsening shortness of breath. She decompensated. She was put on BiPAP and transferred to the intensive care unit. Today, she is not feeling any better. She still looks very ill. She does not feel well. Son at bedside. RN at bedside. PHYSICAL EXAM:   Vitals:    12/16/20 1148   BP:    Pulse:    Resp: (!) 39   Temp:    SpO2:        Physical Exam  Constitutional:       General: She is in acute distress. Appearance: She is well-developed. She is ill-appearing and toxic-appearing. HENT:      Head: Normocephalic and atraumatic. Eyes:      Pupils: Pupils are equal, round, and reactive to light. Neck:      Musculoskeletal: Neck supple. Cardiovascular:      Rate and Rhythm: Normal rate and regular rhythm. Heart sounds: Normal heart sounds. No murmur. Pulmonary:      Effort: Respiratory distress present. Breath sounds: No stridor. No wheezing. Comments: BiPAP in place. Abdominal:      General: Bowel sounds are normal. There is no distension. Palpations: Abdomen is soft. Tenderness: There is no abdominal tenderness. Musculoskeletal: Normal range of motion. Skin:     General: Skin is warm and dry. Findings: No rash. Neurological:      Mental Status: She is alert and oriented to person, place, and time. Cranial Nerves: No cranial nerve deficit.    Psychiatric:         Behavior: Behavior normal.         LABS:   Results for orders placed or performed during the hospital encounter of 12/14/20 Culture, Blood 1    Specimen: Blood   Result Value Ref Range    Specimen Description . BLOOD     Special Requests LH, 10ML, RB     Culture NO GROWTH 2 DAYS    Culture, Blood 1    Specimen: Blood   Result Value Ref Range    Specimen Description . BLOOD     Special Requests RIGHT AC, 10ML, KM     Culture NO GROWTH 2 DAYS    Respiratory Panel, Molecular, with COVID-19    Specimen: Nasopharyngeal Swab   Result Value Ref Range    Specimen Description . NASOPHARYNGEAL SWAB     Adenovirus PCR Not Detected Not Detected    Coronavirus 229E PCR Not Detected Not Detected    Coronavirus HKU1 PCR Not Detected Not Detected    Coronavirus NL63 PCR Not Detected Not Detected    Coronavirus OC43 PCR Not Detected Not Detected    SARS-CoV-2, PCR Not Detected Not Detected    Human Metapneumovirus PCR Not Detected Not Detected    Rhino/Enterovirus PCR Not Detected Not Detected    Influenza A by PCR Not Detected Not Detected    Influenza A H1 PCR NOT REPORTED Not Detected    Influenza A H1 (2009) PCR NOT REPORTED Not Detected    Influenza A H3 PCR NOT REPORTED Not Detected    Influenza B by PCR Not Detected Not Detected    Parainfluenza 1 PCR Not Detected Not Detected    Parainfluenza 2 PCR Not Detected Not Detected    Parainfluenza 3 PCR Not Detected Not Detected    Parainfluenza 4 PCR Not Detected Not Detected    Resp Syncytial Virus PCR Not Detected Not Detected    Bordetella Parapertussis Not Detected Not Detected    B Pertussis by PCR Not Detected Not Detected    Chlamydia pneumoniae By PCR Not Detected Not Detected    Mycoplasma pneumo by PCR Not Detected Not Detected   CBC Auto Differential   Result Value Ref Range    WBC 4.8 3.5 - 11.3 k/uL    RBC 3.03 (L) 3.95 - 5.11 m/uL    Hemoglobin 10.6 (L) 11.9 - 15.1 g/dL    Hematocrit 32.9 (L) 36.3 - 47.1 %    .6 (H) 82.6 - 102.9 fL    MCH 35.0 (H) 25.2 - 33.5 pg    MCHC 32.2 28.4 - 34.8 g/dL    RDW 22.5 (H) 11.8 - 14.4 %    Platelets 356 521 - 922 k/uL    MPV 11.1 8.1 - 13.5 fL NRBC Automated 0.4 (H) 0.0 per 100 WBC    Differential Type NOT REPORTED     WBC Morphology NOT REPORTED     RBC Morphology NOT REPORTED     Platelet Estimate NOT REPORTED     Seg Neutrophils 56 36 - 65 %    Lymphocytes 23 (L) 24 - 43 %    Monocytes 19 (H) 3 - 12 %    Eosinophils % 0 (L) 1 - 4 %    Basophils 1 0 - 2 %    Immature Granulocytes 1 (H) 0 %    Segs Absolute 2.69 1.50 - 8.10 k/uL    Absolute Lymph # 1.10 1.10 - 3.70 k/uL    Absolute Mono # 0.91 0.10 - 1.20 k/uL    Absolute Eos # 0.00 0.00 - 0.44 k/uL    Basophils Absolute 0.05 0.00 - 0.20 k/uL    Absolute Immature Granulocyte 0.05 0.00 - 0.30 k/uL    Morphology ANISOCYTOSIS PRESENT    Basic Metabolic Panel   Result Value Ref Range    Glucose 108 (H) 70 - 99 mg/dL    BUN 17 6 - 20 mg/dL    CREATININE 0.65 0.50 - 0.90 mg/dL    Bun/Cre Ratio 26 (H) 9 - 20    Calcium 9.5 8.6 - 10.4 mg/dL    Sodium 138 135 - 144 mmol/L    Potassium 4.4 3.7 - 5.3 mmol/L    Chloride 100 98 - 107 mmol/L    CO2 23 20 - 31 mmol/L    Anion Gap 15 9 - 17 mmol/L    GFR Non-African American >60 >60 mL/min    GFR African American >60 >60 mL/min    GFR Comment          GFR Staging NOT REPORTED    COVID-19    Specimen: Other   Result Value Ref Range    SARS-CoV-2          SARS-CoV-2, Rapid Not Detected Not Detected    Source . NASOPHARYNGEAL SWAB     SARS-CoV-2         C-Reactive Protein   Result Value Ref Range    CRP 15.4 (H) 0.0 - 5.0 mg/L   Ferritin   Result Value Ref Range    Ferritin 3,423 (H) 13 - 150 ug/L   Lactate Dehydrogenase   Result Value Ref Range     (H) 135 - 214 U/L   Procalcitonin   Result Value Ref Range    Procalcitonin 0.13 (H) <0.09 ng/mL   Basic Metabolic Panel w/ Reflex to MG   Result Value Ref Range    Glucose 94 70 - 99 mg/dL    BUN 14 6 - 20 mg/dL    CREATININE 0.74 0.50 - 0.90 mg/dL    Bun/Cre Ratio 19 9 - 20    Calcium 8.4 (L) 8.6 - 10.4 mg/dL    Sodium 136 135 - 144 mmol/L    Potassium 3.4 (L) 3.7 - 5.3 mmol/L    Chloride 101 98 - 107 mmol/L    CO2 24 20 - 31 mmol/L    Anion Gap 11 9 - 17 mmol/L    GFR Non-African American >60 >60 mL/min    GFR African American >60 >60 mL/min    GFR Comment          GFR Staging NOT REPORTED    CBC   Result Value Ref Range    WBC 4.9 3.5 - 11.3 k/uL    RBC 2.39 (L) 3.95 - 5.11 m/uL    Hemoglobin 8.3 (L) 11.9 - 15.1 g/dL    Hematocrit 26.2 (L) 36.3 - 47.1 %    .6 (H) 82.6 - 102.9 fL    MCH 34.7 (H) 25.2 - 33.5 pg    MCHC 31.7 28.4 - 34.8 g/dL    RDW 22.5 (H) 11.8 - 14.4 %    Platelets 209 (L) 180 - 453 k/uL    MPV 11.9 8.1 - 13.5 fL    NRBC Automated 0.4 (H) 0.0 per 100 WBC   D-Dimer, Quantitative   Result Value Ref Range    D-Dimer, Quant 3.71 (H) 0.00 - 0.59 mg/L FEU   Magnesium   Result Value Ref Range    Magnesium 1.2 (L) 1.6 - 2.6 mg/dL   Iron and TIBC   Result Value Ref Range    Iron 96 37 - 145 ug/dL    TIBC 160 (L) 250 - 450 ug/dL    Iron Saturation 60 (H) 20 - 55 %    UIBC 64 (L) 112 - 347 ug/dL   POC PANEL (G3)-ART   Result Value Ref Range    POC pH 7.10 (LL) 7.35 - 7.45    POC pCO2 32 32 - 45 mm Hg    POC PO2 65 (L) 75 - 95 mm Hg    TCO2 (calc), Art 11 (L) 23 - 28 mmol/L    POC HCO3 9.9 (L) 22 - 27 mmol/L    Negative Base Excess, Art 20 (H) 0.0 - 2.0    Positive Base Excess, Art NOT REPORTED 0.0 - 2.0    POC O2 SAT 84 %    Pt Temp 37.0     O2 Device/Flow/% NOT REPORTED     FIO2 1.0     POC pH Temp NOT REPORTED     POC pCO2 Temp NOT REPORTED mm Hg    POC pO2 Temp NOT REPORTED mm Hg   Basic Metabolic Panel w/ Reflex to MG   Result Value Ref Range    Glucose 273 (H) 70 - 99 mg/dL    BUN 14 6 - 20 mg/dL    CREATININE 0.81 0.50 - 0.90 mg/dL    Bun/Cre Ratio 17 9 - 20    Calcium 8.2 (L) 8.6 - 10.4 mg/dL    Sodium 141 135 - 144 mmol/L    Potassium 3.9 3.7 - 5.3 mmol/L    Chloride 97 (L) 98 - 107 mmol/L    CO2 24 20 - 31 mmol/L    Anion Gap 20 (H) 9 - 17 mmol/L    GFR Non-African American >60 >60 mL/min    GFR African American >60 >60 mL/min    GFR Comment          GFR Staging NOT REPORTED    POC PANEL (G3)-ART   Result Value Ref Range    POC pH 7.42 7.35 - 7.45    POC pCO2 44 32 - 45 mm Hg    POC PO2 53 (L) 75 - 95 mm Hg    TCO2 (calc), Art 30 (H) 23 - 28 mmol/L    POC HCO3 28.4 (H) 22 - 27 mmol/L    Negative Base Excess, Art NOT REPORTED 0.0 - 2.0    Positive Base Excess, Art 4 (H) 0.0 - 2.0    POC O2 SAT 88 %    Pt Temp 98.9     O2 Device/Flow/% NOT REPORTED     FIO2 100.0     POC pH Temp NOT REPORTED     POC pCO2 Temp NOT REPORTED mm Hg    POC pO2 Temp NOT REPORTED mm Hg   POC PANEL (G3)-ART   Result Value Ref Range    POC pH 7.46 (H) 7.35 - 7.45    POC pCO2 45 32 - 45 mm Hg    POC PO2 62 (L) 75 - 95 mm Hg    TCO2 (calc), Art 33 (H) 23 - 28 mmol/L    POC HCO3 32.1 (H) 22 - 27 mmol/L    Negative Base Excess, Art NOT REPORTED 0.0 - 2.0    Positive Base Excess, Art 8 (H) 0.0 - 2.0    POC O2 SAT 92 %    Pt Temp 37.0     O2 Device/Flow/% NOT REPORTED     FIO2 1.0     POC pH Temp NOT REPORTED     POC pCO2 Temp NOT REPORTED mm Hg    POC pO2 Temp NOT REPORTED mm Hg   Basic Metabolic Panel w/ Reflex to MG   Result Value Ref Range    Glucose 135 (H) 70 - 99 mg/dL    BUN 14 6 - 20 mg/dL    CREATININE 0.49 (L) 0.50 - 0.90 mg/dL    Bun/Cre Ratio 29 (H) 9 - 20    Calcium 7.9 (L) 8.6 - 10.4 mg/dL    Sodium 142 135 - 144 mmol/L    Potassium 4.3 3.7 - 5.3 mmol/L    Chloride 105 98 - 107 mmol/L    CO2 29 20 - 31 mmol/L    Anion Gap 8 (L) 9 - 17 mmol/L    GFR Non-African American >60 >60 mL/min    GFR African American >60 >60 mL/min    GFR Comment          GFR Staging NOT REPORTED    CBC   Result Value Ref Range    WBC 9.7 3.5 - 11.3 k/uL    RBC 2.59 (L) 3.95 - 5.11 m/uL    Hemoglobin 9.2 (L) 11.9 - 15.1 g/dL    Hematocrit 28.6 (L) 36.3 - 47.1 %    .4 (H) 82.6 - 102.9 fL    MCH 35.5 (H) 25.2 - 33.5 pg    MCHC 32.2 28.4 - 34.8 g/dL    RDW 22.3 (H) 11.8 - 14.4 %    Platelets 414 (L) 574 - 453 k/uL    MPV 11.6 8.1 - 13.5 fL    NRBC Automated 0.2 (H) 0.0 per 100 WBC   POC Glucose Fingerstick   Result Value Ref Range    POC Glucose 273 (H) 65 - 105 mg/dL   POC Glucose Fingerstick   Result Value Ref Range    POC Glucose 243 (H) 65 - 105 mg/dL   POC Glucose Fingerstick   Result Value Ref Range    POC Glucose 152 (H) 65 - 105 mg/dL   POC Glucose Fingerstick   Result Value Ref Range    POC Glucose 118 (H) 65 - 105 mg/dL   POC Glucose Fingerstick   Result Value Ref Range    POC Glucose 122 (H) 65 - 105 mg/dL   EKG 12 Lead   Result Value Ref Range    Ventricular Rate 134 BPM    Atrial Rate 134 BPM    P-R Interval 126 ms    QRS Duration 68 ms    Q-T Interval 304 ms    QTc Calculation (Bazett) 453 ms    P Axis 76 degrees    R Axis 29 degrees    T Axis 55 degrees       IMPRESSION:   1. Advanced adenocarcinoma of the right lung  2. Extensive metastatic disease involving lungs, right pleura, liver, brain  3. Status post palliative systemic therapy with carboplatin/pemetrexed/pembrolizumab May 2020 through August 2020  4. Initiated systemic therapy with vinorelbine/pembrolizumab September 2020, pembrolizumab last dispensed 11/25/2020, vinorelbine last dispensed 12/2/2020  5. Acute on chronic pain syndrome, secondary to malignancy  6. Extensive pulmonary opacities, infection versus lymphangitic carcinomatosis versus immunotherapy induced pneumonitis  7. Deconditioning/debility       PLAN:     1. Met with patient and son at bedside. They did not have many questions for me. Did my best answer any questions from them. 2. Pulmonary status has not improved with the initiation of steroids. She remains on broad-spectrum antimicrobials but I am not convinced that the bilateral pulmonary infiltrates represent infection. If they represented pneumonitis from pembrolizumab, steroid should help this. However, the patient is not clinically improving with steroids. I suspect that the diffuse opacities and pulmonary infiltrates represent lymphangitic carcinomatosis. 3. Patient acutely decompensated yesterday.   Unfortunately, her condition would prevent consideration of any additional cancer treatment such as chemotherapy. She is too sick and frail to consider this. Palliative service has been following. Appreciate their input. 4. Unfortunately, I do not feel that the patient is a candidate for any additional aggressive oncologic intervention such as chemotherapy. Would recommend consideration of supportive care and hospice. I did speak with her primary oncologist, Dr. Moise Gonzalez, earlier today. I updated him of her status. He agrees with this assessment and feels hospice is appropriate in this setting. 5. We will continue to follow. Call with questions.       Electronically signed by Domitila You MD on 12/16/2020 at 12:03 PM

## 2020-12-16 NOTE — PROGRESS NOTES
[facility]  Office: [address], DO, Mariama Sherman, DO, Ranulfo Isidra, DO, Mary Lou Saunders, DO, Valiant Sicard, MD, Beny Cee MD, Alcides Amin MD, Devyn Bains MD, Maria Luisa King MD, Wilfredo Navarrete MD, Samantha Alexander MD, Félix Pandey MD, Mbnicole Rodríguez MD, Alexander Montoya, DO, Jocelyne Quiroz MD, Aidan Holcomb MD, Tammi Galeas DO, Elizabeth Hernandez MD,  Nirav Sarah, DO, Adeline Simpson MD, Mervat Horton MD, Getachew Paez, [facility], [facility], [facility], Poornima Mcduffie, CNP, Leanne Llanos, CNS, Delmis Nur, CNP, Manjula Pizano, CNP, Rachel Quinonez, CNP, Zackary García, CNP, Checo Prince, CNP, Elisa Beckford PA-C, Nirav Roy, [facility], Ramo Oneal, CNP, Ismael Che, CNP, Rosaura Dalton, CNP, Altagracia Apple, CNP, Chandler Franco, [facility]    Progress Note    12/16/2020    1:38 PM    Name:   Roshan Rios  MRN:     [id number]     Acct:      [de-identified]   Room:   2029/2029-01  IP Day:  2  Admit Date:  12/14/2020 11:03 AM    PCP:   Nain Gomez MD  Code Status:  Full Code    Subjective:     C/C:   Chief Complaint   Patient presents with    Shortness of Breath     Interval History Status: Worsened    Patient condition is worsened overnight. His respiratory status continues to decline. Options for care discussed with both oncology, pulmonology, patient and family. Realistic goals of care were held with the patient and she recognizes that her condition is terminal and she reports \"I am ready to meet the Lord\" palliative care consultation is brought in end-of-life discussion is held between palliative care, myself, son, and patient. Patient has elected to change her CODE STATUS to DNR CC and has requested a hospice consultation arranged. Brief History:     12/14 -stage IV metastatic lung cancer admitted for shortness of breath. No indication for infectious process.   General fatigue and weakness are persistent. 12/15-poor overall prognosis, oncology and pulmonology has been consulted to assist with this challenging case. 12/16 -patient has elected to change CODE STATUS to DNR CC and enroll in hospice care. Review of Systems:     Constitutional: Extreme fatigue  Respiratory: Extreme dyspnea  Cardiovascular: Tachycardic  Gastrointestinal:  negative for abdominal pain, constipation, diarrhea, nausea, vomiting  Neurological: Reports fatigue and global weakness    Medications: Allergies:     Allergies   Allergen Reactions    Ibuprofen Hives     Other reaction(s): Unknown       Current Meds:   Scheduled Meds:    methylPREDNISolone  40 mg Intravenous Q6H    piperacillin-tazobactam  3.375 g Intravenous Q8H    insulin lispro  0-12 Units Subcutaneous TID WC    insulin lispro  0-6 Units Subcutaneous Nightly    heparin (porcine)  5,000 Units Subcutaneous BID    folic acid  1 mg Oral Daily    therapeutic multivitamin-minerals  1 tablet Oral Daily    pantoprazole  40 mg Oral Daily    budesonide-formoterol  2 puff Inhalation BID    Vitamin D  2,000 Units Oral Daily    sodium chloride flush  10 mL Intravenous 2 times per day    ipratropium-albuterol  1 ampule Inhalation Q4H WA    azithromycin  500 mg Intravenous Q24H     Continuous Infusions:    sodium chloride 100 mL/hr at 12/16/20 1300    dextrose      dexmedetomidine (PRECEDEX) IV infusion 0.6 mcg/kg/hr (12/16/20 1317)    norepinephrine 2 mcg/min (12/16/20 1322)     PRN Meds: potassium chloride **OR** potassium alternative oral replacement **OR** potassium chloride, magnesium sulfate, ALPRAZolam, glucose, dextrose, glucagon (rDNA), dextrose, metoprolol, oxyCODONE, sodium chloride flush, nicotine, promethazine **OR** ondansetron, magnesium hydroxide, acetaminophen **OR** acetaminophen, albuterol, sodium chloride flush    Data:     Past Medical History:   has a past medical history of Asthma, Cancer ([address] Lung mass, and PUD (peptic ulcer disease). Social History:   reports that she has quit smoking. She smoked 0.50 packs per day. She has never used smokeless tobacco. She reports current alcohol use of about 6.0 standard drinks of alcohol per week. She reports current drug use. Family History:   Family History   Problem Relation Age of Onset    Hypertension Mother     Heart Disease Mother        Vitals:  /82   Pulse 100   Temp 98 °F (36.7 °C) (Temporal)   Resp (!) 39   Ht 5' 1\" (1.549 m)   Wt 81 lb (36.7 kg)   SpO2 92%   BMI 15.30 kg/m²   Temp (24hrs), Av.8 °F (36.6 °C), Min:97 °F (36.1 °C), Max:98.9 °F (37.2 °C)    Recent Labs     12/15/20  1528 12/15/20  2135 20  0725 20  1137   POCGLU 243* 152* 118* 122*       I/O (24Hr):     Intake/Output Summary (Last 24 hours) at 2020 1338  Last data filed at 2020 0600  Gross per 24 hour   Intake 3694 ml   Output --   Net 3694 ml       Labs:  Hematology:  Recent Labs     20  1134 20  2050 12/15/20  0442 20  0629   WBC 4.8  --  4.9 9.7   RBC 3.03*  --  2.39* 2.59*   HGB 10.6*  --  8.3* 9.2*   HCT 32.9*  --  26.2* 28.6*   .6*  --  109.6* 110.4*   MCH 35.0*  --  34.7* 35.5*   MCHC 32.2  --  31.7 32.2   RDW 22.5*  --  22.5* 22.3*     --  125* 115*   MPV 11.1  --  11.9 11.6   CRP 15.4*  --   --   --    DDIMER  --  3.71*  --   --      Chemistry:  Recent Labs     12/15/20  0442 12/15/20  1516 20  0629    141 142   K 3.4* 3.9 4.3    97* 105   CO2 24 24 29   GLUCOSE 94 273* 135*   BUN 14 14 14   CREATININE 0.74 0.81 0.49*   MG 1.2*  --   --    ANIONGAP 11 20* 8*   LABGLOM >60 >60 >60   GFRAA >60 >60 >60   CALCIUM 8.4* 8.2* 7.9*     Recent Labs     20  1134 12/15/20  1316 12/15/20  1528 12/15/20  2135 20  0725 20  1137   *  --   --   --   --   --    POCGLU  --  273* 243* 152* 118* 122*     ABG:  Lab Results   Component Value Date    POCPH 7.46 12/15/2020    POCPCO2 45 12/15/2020    POCPO2 62 12/15/2020    POCHCO3 32.1 12/15/2020    NBEA NOT REPORTED 12/15/2020    PBEA 8 12/15/2020    TEU4STR 33 12/15/2020    CHNN9PCA 92 12/15/2020    FIO2 1.0 12/15/2020     Lab Results   Component Value Date/Time    SPECIAL LH, 10ML, RB 12/14/2020 12:40 PM    SPECIAL RIGHT AC, 10ML, KM 12/14/2020 12:40 PM     Lab Results   Component Value Date/Time    CULTURE NO GROWTH 2 DAYS 12/14/2020 12:40 PM    CULTURE NO GROWTH 2 DAYS 12/14/2020 12:40 PM       Radiology:  Xr Chest Portable    Result Date: 12/14/2020  Stable right-sided cardiopulmonary findings. New airspace disease identified throughout the left hemithorax, concerning for multifocal pneumonia. Ct Chest Pulmonary Embolism W Contrast    Result Date: 12/14/2020  1. No evidence of pulmonary embolism 2. Severe narrowing of the right upper and lower lobe branches of the right pulmonary artery, secondary to the central obstructing lesion and pleural based metastatic disease 3. Interval development of extensive pleural based soft tissue changes throughout the right hemithorax, consistent with metastatic disease 4. Sclerotic and lytic changes now present involving the right 3rd, 4th, and 8th ribs, consistent with metastatic disease 5. Extensive airspace disease now present within the left upper and lower lobes. Differential considerations would include infection, versus post radiation pneumonitis 6.  Interval decrease in size of the consolidative mass, right upper lobe, and mediastinal and left hilar adenopathy       Physical Examination:        General appearance: Extreme respiratory distress, she is frail and cachectic  Mental Status:  oriented to person, place and time with an anxious and aggressive affect, on Precedex this is helped  Lungs: Increased respiratory effort, diffuse rhonchi, assessment limited by persistent cough  Heart:  regular rate and rhythm, no murmur, tachycardic  Abdomen:  soft, nontender, nondistended, normal bowel sounds, no masses, hepatomegaly, splenomegaly  Extremities:  no edema, redness, tenderness in the calves  Skin:  no gross lesions, rashes, induration    Assessment:        Hospital Problems           Last Modified POA    * (Principal) Acute on chronic respiratory failure with hypoxia and hypercapnia ([address] 12/15/2020 Yes    Adenocarcinoma, lung, right ([address] 12/14/2020 Yes    Pneumonia due to organism 12/15/2020 Yes    Overview Signed 12/15/2020  9:12 AM by Jorge L Ortiz Blood, DO     Likely bacterial         Sinus tachycardia 12/14/2020 Yes    Severely underweight adult 12/14/2020 Yes    Severe malnutrition ([address] 12/15/2020 Yes    Palliative care encounter 12/15/2020 Yes    Goals of care, counseling/discussion 12/15/2020 Yes    DNR (do not resuscitate) discussion 12/15/2020 Yes          Plan:          1. Oncology recommendations are appreciated  2. Code status changed to DNR-CC  3.  Hospice and palliative consult     FAHAD Inman NP  12/16/2020  1:38 PM

## 2020-12-17 LAB
FOLATE: >20 NG/ML
VITAMIN B-12: 1686 PG/ML (ref 232–1245)

## 2020-12-17 NOTE — DISCHARGE SUMMARY
[facility]  Office: [address], DO, Leila Zamorano, DO, Ag Quita, DO, Claudia Villanuevajasper Saunders, DO, Haydee Ku MD, Leandro Duron MD, Apurva Chille MD, Juan Moya MD, Alfredo Pham MD, Beryle Marseille, MD, Ciro Pallas, MD, Jeff Mc MD, Ellis Rivas MD, Magan Giron, DO, Shea Aviles MD, Melissa Garcia MD, Altagracia Abbott, DO, Cricket Shore MD,  Brian Nichole, DO, Juan Miguel Richards MD, Lakeshia Alfaro MD, Selma Johnson, [facility], [facility], [facility], Chandler Parks CNP, Walter Chand, CNS, Sammy Vargas, CNP, Lilian Turcios, CNP, Tammy Vega, CNP, Ryne Cavanaugh, CNP, Soham Leija, CNP, Stacey Yancey PA-C, Mortimer Guardian, [facility], Taz Bush, CNP, Lavonne Frankel, CNP, Lara Contreras, CNP, Rigoberto Jeter, CNP, [facility]    Discharge Summary     Patient ID: Maurisio Schwartz  :  1960   MRN: [id number]     ACCOUNT:  [de-identified]   Patient's PCP: Rachel Rachel MD  Admit Date: 2020   Discharge Date: 2020     Length of Stay: 3  Code Status:   [facility]  Admitting Physician: Celso Bradford DO  Discharge Physician: FAHAD Castillo - AXEL     Active Discharge Diagnoses:     Hospital Problem Lists:  Principal Problem:    Acute on chronic respiratory failure with hypoxia and hypercapnia (HCC)  Active Problems:    Adenocarcinoma, lung, right ([address]    Pneumonia due to organism    Sinus tachycardia    Severely underweight adult    Severe malnutrition ([facility]    Palliative care encounter    Goals of care, counseling/discussion    DNR (do not resuscitate) discussion    Multifocal pneumonia  Resolved Problems:    * No resolved hospital problems.  *      Admission Condition:  poor     Discharged Condition:      Hospital Stay:     Hospital Course:  Maurisio Schwartz is a 64 y.o. female who was admitted for the management of  Acute on chronic respiratory failure with hypoxia and hypercapnia ([address] , presented to ER with Shortness of Breath     -stage IV metastatic lung cancer admitted for shortness of breath. No indication for infectious process. General fatigue and weakness are persistent.     12/15-poor overall prognosis, oncology and pulmonology has been consulted to assist with this challenging case.      -patient has elected to change CODE STATUS to DNR CC and enroll in hospice care. Patient condition is worsened overnight. Her respiratory status continues to decline. Options for care discussed with both oncology, pulmonology, patient and family. Realistic goals of care were held with the patient and she recognizes that her condition is terminal and she reports \"I am ready to meet the Lord\" palliative care consultation is brought in end-of-life discussion is held between palliative care, myself, son, and patient. Patient has elected to change her CODE STATUS to DNR CC and has requested a hospice consultation arranged. Pt  shortly after making the decision to transition to end of life care and after family arrived at the bedside. Significant therapeutic interventions: End of life discussion and comfort care.      Significant Diagnostic Studies:   Labs / Micro:  CBC:   Lab Results   Component Value Date    WBC 9.7 2020    RBC 2.59 2020    HGB 9.2 2020    HCT 28.6 2020    .4 2020    MCH 35.5 2020    MCHC 32.2 2020    RDW 22.3 2020     2020     BMP:    Lab Results   Component Value Date    GLUCOSE 135 2020     2020    K 4.3 2020     2020    CO2 29 2020    ANIONGAP 8 2020    BUN 14 2020    CREATININE 0.49 2020    BUNCRER 29 2020    CALCIUM 7.9 2020    LABGLOM >60 2020    GFRAA >60 2020    GFR      2020    GFR NOT REPORTED 2020     U/A:  No results found for: [address], BREANN Ruff, Avani Ellynmally     Radiology:  Xr Chest Portable    Result Date: 12/16/2020  Overall relatively stable cardiopulmonary status. Interval placement of a right arm PICC line. Similar diffuse ground-glass infiltrate in the left lung. Right lung mass with chronic pleural/parenchymal changes. Xr Chest Portable    Result Date: 12/15/2020  1. Stable diffuse bilateral pulmonary ground-glass opacity. 2. Stable moderate to large loculated right pleural effusion versus pleural thickening. Xr Chest Portable    Result Date: 12/14/2020  Stable right-sided cardiopulmonary findings. New airspace disease identified throughout the left hemithorax, concerning for multifocal pneumonia. Ct Chest Pulmonary Embolism W Contrast    Result Date: 12/14/2020  1. No evidence of pulmonary embolism 2. Severe narrowing of the right upper and lower lobe branches of the right pulmonary artery, secondary to the central obstructing lesion and pleural based metastatic disease 3. Interval development of extensive pleural based soft tissue changes throughout the right hemithorax, consistent with metastatic disease 4. Sclerotic and lytic changes now present involving the right 3rd, 4th, and 8th ribs, consistent with metastatic disease 5. Extensive airspace disease now present within the left upper and lower lobes. Differential considerations would include infection, versus post radiation pneumonitis 6.  Interval decrease in size of the consolidative mass, right upper lobe, and mediastinal and left hilar adenopathy       Consultations:    Consults:     Final Specialist Recommendations/Findings:   IP CONSULT TO PULMONOLOGY  IP CONSULT TO CARDIOLOGY  IP CONSULT TO ONCOLOGY  IP CONSULT TO PALLIATIVE CARE  IP CONSULT TO PALLIATIVE CARE  IP CONSULT TO HOSPICE      The patient was seen and examined on day of discharge and this discharge summary is in conjunction with any daily progress note from day of discharge. Discharge plan:     Disposition:     Physician Follow Up:     No follow-up provider specified. Requiring Further Evaluation/Follow Up POST HOSPITALIZATION/Incidental Findings: NONE    Diet:     Activity:      Instructions to Patient:      Discharge Medications:      Medication List      STOP taking these medications    HYDROcodone-acetaminophen 5-325 MG per tablet  Commonly known as: Kiley Martinez your doctor about these medications    albuterol sulfate  (90 Base) MCG/ACT inhaler  Commonly known as: Ventolin HFA  Inhale 2 puffs into the lungs every 6 hours as needed for Wheezing     fluticasone-salmeterol 250-50 MCG/DOSE Aepb  Commonly known as: ADVAIR  Inhale 1 puff into the lungs 2 times daily     folic acid 1 MG tablet  Commonly known as: FOLVITE     multivitamin tablet     oxyCODONE 5 MG immediate release tablet  Commonly known as: ROXICODONE     pantoprazole 40 MG tablet  Commonly known as: PROTONIX     Vitamin D3 50 MCG (2000 UT) Caps            No discharge procedures on file. Time Spent on discharge is  20 mins in patient examination, evaluation, counseling as well as medication reconciliation, prescriptions for required medications, discharge plan and follow up. Electronically signed by   FAHAD Tripathi NP  2020  7:57 AM      Thank you Dr. Germania Hui MD for the opportunity to be involved in this patient's care.

## 2020-12-17 NOTE — FLOWSHEET NOTE
Writer receives [employer] at 8:51 from Jean-Paul Mixon that patient had just  at 8:40 pm. Writer arrives on the Med Surg unit to learn that the patient's son Juan R Hsu [phone]) had already gone. Writer contacts the son to inquire about the  home that the family would like to use. Jessica Kumar did not know which  home they wanted and asked me to call back in 15 minutes. When I returned the call, I got his voicemail. Patient's son returns my call and the [address] has chosen [address] ([phone]). Writer assists with completion of the Expiration Summary/Release of Body form. Writer contacts  home at 11:15 pm and delivers the completed Expiration Summary/Release of Body form to the ED registration desk. Writer also completes a sympathy card.

## 2020-12-17 NOTE — PROGRESS NOTES
Patient transferred to the floor from CVICU. The son is present at the bedside. Patient is on 2L O2 and stat is 50%.

## 2020-12-17 NOTE — CONSULTS
Consults   I am called to the floor to pronounce patient. I am informed patient is now DNR CC. When I arrived at the bedside she has no spontaneous respiration. No spontaneous pulse. She is unresponsive. Pupils are fixed and dilated. Time of death recorded as 20:40.

## 2020-12-17 NOTE — PROGRESS NOTES
PCT entered the room to take admission vitals. Patient was not breathing and pulseless. Notified ER Dr, Dr Geo Bob. The time of death was pronounced at 2040. House supervisor, Timmy Martin, notified.  notified.

## 2020-12-17 NOTE — PROGRESS NOTES
I have notified the [facility] and [facility].  The patient has been washed up and is ready for the  homes arrival.

## 2020-12-17 NOTE — PROGRESS NOTES
House of Day [facility] arrived to the floor with security to take the patient.

## 2020-12-20 LAB
CULTURE: NORMAL
CULTURE: NORMAL
Lab: NORMAL
Lab: NORMAL
SPECIMEN DESCRIPTION: NORMAL
SPECIMEN DESCRIPTION: NORMAL

## 2022-12-29 NOTE — ED NOTES
Pt to ED with asthma SOB and bilat rib and side pain for the last two weeks pt states it is much worse today.      Mark Anthony Ramon, RN  03/10/20 [id number]
84

## 2023-06-13 NOTE — TELEPHONE ENCOUNTER
Caller: Tawana Thomas    Topic/issue: Patient called back, states she would only like to speak with Minerva regarding past cardiology care and other questions she has. Please advise.     Callback Number: [phone] (home)     Thank you,   Michelle GERARD     Name: Patricio Plata  : 1960  MRN: [id number]    Oncology Navigation Follow-Up Note    Contact Type:  Telephone    Subjective:     Objective:     Assistance Needed:     Receptive to Advanced Care Planning / Palliative Care:      Referrals:     Education:     Notes: Navigator reviewing care everywhere and pt.  Following with Rad Onc @ [facility]    Electronically signed by Lupe Eastman RN on 2020 at 1:05 PM

## (undated) DEVICE — CONTAINER,SPECIMEN,OR STERILE,4OZ: Brand: MEDLINE

## (undated) DEVICE — ADAPTER TBNG LUER STUB 15 GA INTMED

## (undated) DEVICE — MEDICINE CUP, GRADUATED, STER: Brand: MEDLINE

## (undated) DEVICE — CONMED DISPOSABLE BRONCHIAL CYTOLOGY BRUSH, STRAIGHT HANDLE, Ø2 MM: Brand: CONMED